# Patient Record
Sex: FEMALE | Race: WHITE | Employment: OTHER | URBAN - METROPOLITAN AREA
[De-identification: names, ages, dates, MRNs, and addresses within clinical notes are randomized per-mention and may not be internally consistent; named-entity substitution may affect disease eponyms.]

---

## 2020-10-15 ENCOUNTER — HOSPITAL ENCOUNTER (OUTPATIENT)
Dept: INFUSION THERAPY | Age: 70
Discharge: HOME | End: 2020-10-15
Payer: MEDICARE

## 2020-10-15 VITALS
RESPIRATION RATE: 18 BRPM | SYSTOLIC BLOOD PRESSURE: 160 MMHG | OXYGEN SATURATION: 100 % | HEART RATE: 63 BPM | TEMPERATURE: 97.6 F | DIASTOLIC BLOOD PRESSURE: 84 MMHG | WEIGHT: 100.7 LBS

## 2020-10-15 LAB
ANION GAP BLD CALC-SCNC: 16 MMOL/L (ref 10–20)
BUN BLD-MCNC: 14 MG/DL (ref 9–20)
CA-I BLD-MCNC: 1.24 MMOL/L (ref 1.12–1.32)
CHLORIDE BLD-SCNC: 97 MMOL/L (ref 98–107)
CO2 BLD-SCNC: 32 MMOL/L (ref 21–32)
CREAT BLD-MCNC: 0.7 MG/DL (ref 0.6–1.3)
GLUCOSE BLD-MCNC: 64 MG/DL (ref 65–100)
HCT VFR BLD CALC: 41 % (ref 35–47)
MAGNESIUM SERPL-MCNC: 2 MG/DL (ref 1.6–2.4)
PHOSPHATE SERPL-MCNC: 3.8 MG/DL (ref 2.6–4.7)
POTASSIUM BLD-SCNC: 3.3 MMOL/L (ref 3.5–5.1)
SERVICE CMNT-IMP: ABNORMAL
SODIUM BLD-SCNC: 141 MMOL/L (ref 136–145)

## 2020-10-15 PROCEDURE — 96372 THER/PROPH/DIAG INJ SC/IM: CPT

## 2020-10-15 PROCEDURE — 80047 BASIC METABLC PNL IONIZED CA: CPT

## 2020-10-15 PROCEDURE — 84100 ASSAY OF PHOSPHORUS: CPT

## 2020-10-15 PROCEDURE — 74011250636 HC RX REV CODE- 250/636: Performed by: FAMILY MEDICINE

## 2020-10-15 PROCEDURE — 36415 COLL VENOUS BLD VENIPUNCTURE: CPT

## 2020-10-15 PROCEDURE — 83735 ASSAY OF MAGNESIUM: CPT

## 2020-10-15 RX ORDER — TIMOLOL MALEATE 6.8 MG/ML
1 SOLUTION/ DROPS OPHTHALMIC DAILY
COMMUNITY

## 2020-10-15 RX ORDER — LANOLIN ALCOHOL/MO/W.PET/CERES
1000 CREAM (GRAM) TOPICAL DAILY
COMMUNITY

## 2020-10-15 RX ORDER — MORPHINE SULFATE 15 MG/1
15 TABLET ORAL 2 TIMES DAILY
COMMUNITY

## 2020-10-15 RX ORDER — LOSARTAN POTASSIUM 100 MG/1
100 TABLET ORAL DAILY
COMMUNITY

## 2020-10-15 RX ORDER — OXYCODONE HYDROCHLORIDE 10 MG/1
10 TABLET ORAL 4 TIMES DAILY
COMMUNITY

## 2020-10-15 RX ORDER — LEVOTHYROXINE SODIUM 100 UG/1
100 TABLET ORAL
COMMUNITY

## 2020-10-15 RX ORDER — METFORMIN HYDROCHLORIDE 500 MG/1
500 TABLET ORAL DAILY
COMMUNITY

## 2020-10-15 RX ORDER — ASPIRIN 81 MG/1
81 TABLET ORAL DAILY
COMMUNITY

## 2020-10-15 RX ORDER — OMEGA-3-ACID ETHYL ESTERS 1 G/1
1 CAPSULE, LIQUID FILLED ORAL DAILY
COMMUNITY

## 2020-10-15 RX ORDER — CYCLOSPORINE 0.5 MG/ML
1 EMULSION OPHTHALMIC DAILY
COMMUNITY

## 2020-10-15 RX ORDER — ERGOCALCIFEROL 1.25 MG/1
50000 CAPSULE ORAL DAILY
COMMUNITY

## 2020-10-15 RX ORDER — ATORVASTATIN CALCIUM 20 MG/1
20 TABLET, FILM COATED ORAL
COMMUNITY

## 2020-10-15 RX ADMIN — DENOSUMAB 60 MG: 60 INJECTION SUBCUTANEOUS at 14:54

## 2020-10-15 NOTE — PROGRESS NOTES
[facility] Short Visit Progress Note    Patient admitted to [facility] for Prolia ambulatory in stable condition. Assessment completed. No new concerns voiced. Patient states she has not had any invasive dental procedures within the last 6-8 weeks nor non scheduled. Labs obtained from [facility] [place] and sent for processing. I-stat performed. Results are within parameters to treat. Vital Signs:  Visit Vitals  BP (!) 160/84 (BP 1 Location: Left arm, BP Patient Position: Sitting)   Pulse 63   Temp 97.6 °F (36.4 °C)   Resp 18   Wt 45.7 kg (100 lb 11.2 oz)   SpO2 100%   Breastfeeding No     Recent Results (from the past 12 hour(s))   POC CHEM8    Collection Time: 10/15/20  2:34 PM   Result Value Ref Range    Calcium, ionized (POC) 1.24 1.12 - 1.32 mmol/L    Sodium (POC) 141 136 - 145 mmol/L    Potassium (POC) 3.3 (L) 3.5 - 5.1 mmol/L    Chloride (POC) 97 (L) 98 - 107 mmol/L    CO2 (POC) 32 21 - 32 mmol/L    Anion gap (POC) 16 10 - 20 mmol/L    Glucose (POC) 64 (L) 65 - 100 mg/dL    BUN (POC) 14 9 - 20 mg/dL    Creatinine (POC) 0.7 0.6 - 1.3 mg/dL    GFRAA, POC >60 >60 ml/min/1.73m2    GFRNA, POC >60 >60 ml/min/1.73m2    Hematocrit (POC) 41 35.0 - 47.0 %    Comment Comment Not Indicated. Medications:  Medications Administered     denosumab (PROLIA) injection 60 mg     Admin Date  10/15/2020 Action  Given Dose  60 mg Route  SubCUTAneous Administered By  George Diop RN              Given SQ in left upper arm    Patient tolerated treatment well. Patient discharged from [place] [address] ambulatory in no distress. Patient aware of next appointment.     Future Appointments   Date Time Provider Ray Yoder   4/15/2021  2:00 PM SS INF4 CH4 <1H SHARLA Montemayor

## 2021-04-15 ENCOUNTER — HOSPITAL ENCOUNTER (OUTPATIENT)
Dept: INFUSION THERAPY | Age: 71
Discharge: HOME | End: 2021-04-15

## 2022-02-07 ENCOUNTER — HOSPITAL ENCOUNTER (OUTPATIENT)
Dept: INFUSION THERAPY | Age: 72
Discharge: HOME | End: 2022-02-07
Payer: MEDICARE

## 2022-02-07 VITALS
HEART RATE: 68 BPM | SYSTOLIC BLOOD PRESSURE: 120 MMHG | TEMPERATURE: 97.7 F | DIASTOLIC BLOOD PRESSURE: 64 MMHG | OXYGEN SATURATION: 96 % | HEIGHT: 65 IN | WEIGHT: 119 LBS | RESPIRATION RATE: 16 BRPM | BODY MASS INDEX: 19.83 KG/M2

## 2022-02-07 LAB
ANION GAP BLD CALC-SCNC: 10 MMOL/L (ref 10–20)
CA-I BLD-MCNC: 1.23 MMOL/L (ref 1.12–1.32)
CHLORIDE BLD-SCNC: 104 MMOL/L (ref 98–107)
CO2 BLD-SCNC: 31.3 MMOL/L (ref 21–32)
CREAT BLD-MCNC: 0.68 MG/DL (ref 0.6–1.3)
GLUCOSE BLD-MCNC: 110 MG/DL (ref 65–100)
MAGNESIUM SERPL-MCNC: 2 MG/DL (ref 1.6–2.4)
PHOSPHATE SERPL-MCNC: 3.8 MG/DL (ref 2.6–4.7)
POTASSIUM BLD-SCNC: 3.9 MMOL/L (ref 3.5–5.1)
SERVICE CMNT-IMP: ABNORMAL
SODIUM BLD-SCNC: 144 MMOL/L (ref 136–145)

## 2022-02-07 PROCEDURE — 96372 THER/PROPH/DIAG INJ SC/IM: CPT

## 2022-02-07 PROCEDURE — 84100 ASSAY OF PHOSPHORUS: CPT

## 2022-02-07 PROCEDURE — 36415 COLL VENOUS BLD VENIPUNCTURE: CPT

## 2022-02-07 PROCEDURE — 80047 BASIC METABLC PNL IONIZED CA: CPT

## 2022-02-07 PROCEDURE — 83735 ASSAY OF MAGNESIUM: CPT

## 2022-02-07 PROCEDURE — 74011250636 HC RX REV CODE- 250/636: Performed by: FAMILY MEDICINE

## 2022-02-07 RX ADMIN — DENOSUMAB 60 MG: 60 INJECTION SUBCUTANEOUS at 11:48

## 2022-02-07 NOTE — PROGRESS NOTES
[facility] Progress Note    Date: 2022    Name: Sandra Umana    MRN: [id number]         : 1950    Ms. Preeti Ruiz arrived ambulatory and in no distress for Prolia Injection. Assessment was completed, no acute issues at this time, no new complaints voiced. Patient denies any recent or planned dental work. Ms. Sammie Ham vitals were reviewed. Visit Vitals  /64   Pulse 68   Temp 97.7 °F (36.5 °C)   Resp 16   Ht 5' 5\" (1.651 m)   Wt 54 kg (119 lb)   SpO2 96%   Breastfeeding No   BMI 19.80 kg/m²     Recent Results (from the past 24 hour(s))   PHOSPHORUS    Collection Time: 22 11:36 AM   Result Value Ref Range    Phosphorus 3.8 2.6 - 4.7 MG/DL   POC CHEM8    Collection Time: 22 11:36 AM   Result Value Ref Range    Calcium, ionized (POC) 1.23 1.12 - 1.32 mmol/L    Sodium (POC) 144 136 - 145 mmol/L    Potassium (POC) 3.9 3.5 - 5.1 mmol/L    Chloride (POC) 104 98 - 107 mmol/L    CO2 (POC) 31.3 21 - 32 mmol/L    Anion gap (POC) 10 10 - 20 mmol/L    Glucose (POC) 110 (H) 65 - 100 mg/dL    Creatinine (POC) 0.68 0.6 - 1.3 mg/dL    GFRAA, POC >60 >60 ml/min/1.73m2    GFRNA, POC >60 >60 ml/min/1.73m2    Comment Comment Not Indicated. Medications:  Medications Administered     denosumab (PROLIA) injection 60 mg     Admin Date  2022 Action  Given Dose  60 mg Route  SubCUTAneous Administered By  Raine Velez RN                Ms. Preeti Ruiz tolerated treatment well and was discharged from [place] [address] in stable condition. Patient states her doctor will schedule the next injection based on her endocrinology labs.     Evelyn Tim RN  2022

## 2022-11-11 ENCOUNTER — APPOINTMENT (OUTPATIENT)
Dept: GENERAL RADIOLOGY | Age: 72
End: 2022-11-11
Attending: EMERGENCY MEDICINE
Payer: MEDICARE

## 2022-11-11 ENCOUNTER — HOSPITAL ENCOUNTER (EMERGENCY)
Age: 72
Discharge: HOME | End: 2022-11-11
Attending: EMERGENCY MEDICINE
Payer: MEDICARE

## 2022-11-11 VITALS
OXYGEN SATURATION: 100 % | TEMPERATURE: 97.3 F | BODY MASS INDEX: 19.99 KG/M2 | WEIGHT: 120 LBS | DIASTOLIC BLOOD PRESSURE: 73 MMHG | SYSTOLIC BLOOD PRESSURE: 140 MMHG | RESPIRATION RATE: 14 BRPM | HEIGHT: 65 IN | HEART RATE: 75 BPM

## 2022-11-11 DIAGNOSIS — R55 SYNCOPE AND COLLAPSE: Primary | ICD-10-CM

## 2022-11-11 LAB
ALBUMIN SERPL-MCNC: 3.1 G/DL (ref 3.5–5)
ALBUMIN/GLOB SERPL: 1.1 {RATIO} (ref 1.1–2.2)
ALP SERPL-CCNC: 68 U/L (ref 45–117)
ALT SERPL-CCNC: 28 U/L (ref 12–78)
ANION GAP SERPL CALC-SCNC: 3 MMOL/L (ref 5–15)
APPEARANCE UR: CLEAR
AST SERPL-CCNC: 22 U/L (ref 15–37)
ATRIAL RATE: 49 BPM
BACTERIA URNS QL MICRO: NEGATIVE /HPF
BASOPHILS # BLD: 0 K/UL (ref 0–0.1)
BASOPHILS NFR BLD: 1 % (ref 0–1)
BILIRUB SERPL-MCNC: 0.6 MG/DL (ref 0.2–1)
BILIRUB UR QL: NEGATIVE
BUN SERPL-MCNC: 14 MG/DL (ref 6–20)
BUN/CREAT SERPL: 14 (ref 12–20)
CALCIUM SERPL-MCNC: 8.7 MG/DL (ref 8.5–10.1)
CALCULATED P AXIS, ECG09: 58 DEGREES
CALCULATED R AXIS, ECG10: 7 DEGREES
CALCULATED T AXIS, ECG11: 39 DEGREES
CHLORIDE SERPL-SCNC: 106 MMOL/L (ref 97–108)
CO2 SERPL-SCNC: 31 MMOL/L (ref 21–32)
COLOR UR: ABNORMAL
COMMENT, HOLDF: NORMAL
CREAT SERPL-MCNC: 1.02 MG/DL (ref 0.55–1.02)
DIAGNOSIS, 93000: NORMAL
DIFFERENTIAL METHOD BLD: ABNORMAL
EOSINOPHIL # BLD: 0.2 K/UL (ref 0–0.4)
EOSINOPHIL NFR BLD: 3 % (ref 0–7)
EPITH CASTS URNS QL MICRO: ABNORMAL /LPF
ERYTHROCYTE [DISTWIDTH] IN BLOOD BY AUTOMATED COUNT: 12.4 % (ref 11.5–14.5)
GLOBULIN SER CALC-MCNC: 2.8 G/DL (ref 2–4)
GLUCOSE BLD STRIP.AUTO-MCNC: 90 MG/DL (ref 65–117)
GLUCOSE SERPL-MCNC: 92 MG/DL (ref 65–100)
GLUCOSE UR STRIP.AUTO-MCNC: NEGATIVE MG/DL
HCT VFR BLD AUTO: 35.4 % (ref 35–47)
HGB BLD-MCNC: 11.6 G/DL (ref 11.5–16)
HGB UR QL STRIP: NEGATIVE
IMM GRANULOCYTES # BLD AUTO: 0 K/UL (ref 0–0.04)
IMM GRANULOCYTES NFR BLD AUTO: 0 % (ref 0–0.5)
KETONES UR QL STRIP.AUTO: NEGATIVE MG/DL
LEUKOCYTE ESTERASE UR QL STRIP.AUTO: ABNORMAL
LYMPHOCYTES # BLD: 1.4 K/UL (ref 0.8–3.5)
LYMPHOCYTES NFR BLD: 23 % (ref 12–49)
MAGNESIUM SERPL-MCNC: 2 MG/DL (ref 1.6–2.4)
MCH RBC QN AUTO: 30.7 PG (ref 26–34)
MCHC RBC AUTO-ENTMCNC: 32.8 G/DL (ref 30–36.5)
MCV RBC AUTO: 93.7 FL (ref 80–99)
MONOCYTES # BLD: 0.5 K/UL (ref 0–1)
MONOCYTES NFR BLD: 8 % (ref 5–13)
NEUTS SEG # BLD: 3.9 K/UL (ref 1.8–8)
NEUTS SEG NFR BLD: 65 % (ref 32–75)
NITRITE UR QL STRIP.AUTO: NEGATIVE
NRBC # BLD: 0 K/UL (ref 0–0.01)
NRBC BLD-RTO: 0 PER 100 WBC
P-R INTERVAL, ECG05: 152 MS
PH UR STRIP: 7.5 [PH] (ref 5–8)
PLATELET # BLD AUTO: 203 K/UL (ref 150–400)
PMV BLD AUTO: 9.2 FL (ref 8.9–12.9)
POTASSIUM SERPL-SCNC: 4.2 MMOL/L (ref 3.5–5.1)
PROT SERPL-MCNC: 5.9 G/DL (ref 6.4–8.2)
PROT UR STRIP-MCNC: NEGATIVE MG/DL
Q-T INTERVAL, ECG07: 464 MS
QRS DURATION, ECG06: 74 MS
QTC CALCULATION (BEZET), ECG08: 419 MS
RBC # BLD AUTO: 3.78 M/UL (ref 3.8–5.2)
RBC #/AREA URNS HPF: ABNORMAL /HPF (ref 0–5)
SAMPLES BEING HELD,HOLD: NORMAL
SERVICE CMNT-IMP: NORMAL
SODIUM SERPL-SCNC: 140 MMOL/L (ref 136–145)
SP GR UR REFRACTOMETRY: 1 (ref 1–1.03)
UR CULT HOLD, URHOLD: NORMAL
UROBILINOGEN UR QL STRIP.AUTO: 0.2 EU/DL (ref 0.2–1)
VENTRICULAR RATE, ECG03: 49 BPM
WBC # BLD AUTO: 6.1 K/UL (ref 3.6–11)
WBC URNS QL MICRO: ABNORMAL /HPF (ref 0–4)

## 2022-11-11 PROCEDURE — 74011250636 HC RX REV CODE- 250/636: Performed by: EMERGENCY MEDICINE

## 2022-11-11 PROCEDURE — 71045 X-RAY EXAM CHEST 1 VIEW: CPT

## 2022-11-11 PROCEDURE — 96361 HYDRATE IV INFUSION ADD-ON: CPT

## 2022-11-11 PROCEDURE — 81001 URINALYSIS AUTO W/SCOPE: CPT

## 2022-11-11 PROCEDURE — 36415 COLL VENOUS BLD VENIPUNCTURE: CPT

## 2022-11-11 PROCEDURE — 96360 HYDRATION IV INFUSION INIT: CPT

## 2022-11-11 PROCEDURE — 85025 COMPLETE CBC W/AUTO DIFF WBC: CPT

## 2022-11-11 PROCEDURE — 93005 ELECTROCARDIOGRAM TRACING: CPT

## 2022-11-11 PROCEDURE — 82962 GLUCOSE BLOOD TEST: CPT

## 2022-11-11 PROCEDURE — 80053 COMPREHEN METABOLIC PANEL: CPT

## 2022-11-11 PROCEDURE — 99285 EMERGENCY DEPT VISIT HI MDM: CPT

## 2022-11-11 PROCEDURE — 83735 ASSAY OF MAGNESIUM: CPT

## 2022-11-11 RX ADMIN — SODIUM CHLORIDE 1000 ML: 9 INJECTION, SOLUTION INTRAVENOUS at 14:31

## 2022-11-11 NOTE — ED TRIAGE NOTES
Pt with sudden onset of dizziness around 11 to 1130 this am. Family states they were in the vehicle and when they arrived home pt was unresponsive.    Signs and symptoms: dizziness   Code Stroke activation time: 3125  Provider at bedside time:  1348  VAN score: Negative  Last Known Well (Time): 1100  Blood Glucose Result/Time: 90   Blood Pressure: 74/47  Anticoagulants (List medications): N/A

## 2022-11-11 NOTE — ED PROVIDER NOTES
Patient is a 68-year-old woman who comes into the emergency department after syncopal episode. Patient reports that she has been feeling lightheaded, fatigued, and experiencing dry mouth that she remembers feeling lightheaded prior to passing out. Family reports symptoms began while patient was in the car and when they arrived home she was unresponsive, she was breathing and maintained her pulse while she was unconscious, when she awoke she was drowsy and disoriented but has since returned to her baseline mental status. Patient reports that she still feels lightheaded and dry. She has not had any fevers, chills, nausea, vomiting, diarrhea, decreased appetite. Patient reports that her baseline blood pressures are 90s over 60s on her losartan. Patient has had no recent adjustments to her medications and does not believe she made any medication errors today. The history is provided by the patient. Syncope   This is a new problem. The current episode started 1 to 2 hours ago. The problem has been resolved. She lost consciousness for a period of 1 to 5 minutes. The problem is associated with nothing. Associated symptoms include confusion, malaise/fatigue, headaches and light-headedness. Pertinent negatives include no visual change, no chest pain, no palpitations, no fever, no nausea, no vomiting, no focal weakness and no head injury. She has tried nothing for the symptoms. Past Medical History:   Diagnosis Date    Anxiety     Post-menopausal osteoporosis     Sjogren's syndrome ([facility] [address]        No past surgical history on file. No family history on file.     Social History     Socioeconomic History    Marital status:      Spouse name: Not on file    Number of children: Not on file    Years of education: Not on file    Highest education level: Not on file   Occupational History    Not on file   Tobacco Use    Smoking status: Former     Types: Cigarettes     Quit date: 1/8/1985     Years since quittin.8    Smokeless tobacco: Never   Substance and Sexual Activity    Alcohol use: Yes    Drug use: Not on file    Sexual activity: Not on file   Other Topics Concern    Not on file   Social History Narrative    Not on file     Social Determinants of Health     Financial Resource Strain: Not on file   Food Insecurity: Not on file   Transportation Needs: Not on file   Physical Activity: Not on file   Stress: Not on file   Social Connections: Not on file   Intimate Partner Violence: Not on file   Housing Stability: Not on file         ALLERGIES: Sulfa (sulfonamide antibiotics)    Review of Systems   Constitutional:  Positive for malaise/fatigue. Negative for fever. Respiratory:  Negative for shortness of breath. Cardiovascular:  Positive for syncope. Negative for chest pain and palpitations. Gastrointestinal:  Negative for diarrhea, nausea and vomiting. Neurological:  Positive for light-headedness and headaches. Negative for focal weakness. Psychiatric/Behavioral:  Positive for confusion. All other systems reviewed and are negative. Vitals:    22 1347   BP: (!) 74/47   Pulse: 83   Resp: 16   Temp: 97.3 °F (36.3 °C)   SpO2: 97%   Weight: 54.4 kg (120 lb)   Height: 5' 5\" (1.651 m)            Physical Exam  Vitals and nursing note reviewed. Constitutional:       General: She is not in acute distress. Appearance: She is well-developed. HENT:      Head: Normocephalic and atraumatic. Eyes:      Pupils: Pupils are equal, round, and reactive to light. Cardiovascular:      Rate and Rhythm: Normal rate and regular rhythm. Pulmonary:      Effort: Pulmonary effort is normal.      Breath sounds: Normal breath sounds. Abdominal:      General: There is no distension. Palpations: Abdomen is soft. Tenderness: There is no abdominal tenderness. Musculoskeletal:      Cervical back: Normal range of motion and neck supple. Skin:     General: Skin is warm and dry.       Capillary Refill: Capillary refill takes less than 2 seconds. Neurological:      General: No focal deficit present. Mental Status: She is alert and oriented to person, place, and time. Psychiatric:         Mood and Affect: Mood normal.         Behavior: Behavior normal.        [facility]    ED Course as of 11/11/22 2155 Fri Nov 11, 2022   1529 EKG at 2:16 PM shows normal sinus rhythm, 49 bpm, normal axis, normal intervals, no STEMI, no ectopy [IO]      ED Course User Index  [IO] Kay Larson MD       Procedures      The patient is resting comfortably and feels better, is alert, talkative, interactive and in no distress. I suspect that the patient's syncopal episode was related to her profoundly low blood pressure which corrected with IV fluids in the emergency department. The repeat examination is unremarkable and benign. The patient is neurologically intact, has a normal mental status and is ambulatory in the ED. The history, exam, diagnostic testing (if any) and the patient's current condition do not suggest arrhythmia, STEMI, seizure, meningitis, stroke, sepsis, subarachnoid hemorrhage, intracranial bleeding, encephalitis or other significant pathology that would warrant further testing, continued ED treatment, admission, neurological consultation, or other specialist evaluation at this point. The vital signs have been stable. The patient's condition is stable and appropriate for discharge. The patient will pursue further outpatient evaluation with the primary care physician or other designated or consulting physician as indicated in the discharge instructions.

## 2022-11-11 NOTE — DISCHARGE INSTRUCTIONS
You have been evaluated in the Emergency Department today for a possible seizure or syncopal event. Please be sure to review the return precautions included with your discharge instructions. Additionally, the [place] Department of [employer] has additional requirements for evaluation and follow-up that need to be completed prior to re-instatement of your driving privileges. [facility] Seizure/Blackout Policy  ([email or web address] asp)     It is the policy of the Department of Motor Vehicles, based upon guidance and recommendations from the Medical Advisory Board, that an individual must be free of seizures for at least six months in order to establish that medication for the seizure or underlying cause of the seizure is effective and/or that none of the medical conditions that may have caused the seizure have reoccurred. Monitoring and Review of the   If an individual has a history of seizures, the individual and his/her physician must complete a Customer Medical Report ([email or web address]. pdf) before a [address] license may be issued. Once the medical report is received by [facility], it is reviewed to determine if the individual may be licensed to drive a motor vehicle. [facility] reserves the right to request additional information in order to assess the persons ability to safely operate a motor vehicle. Once the individual is licensed to operate a motor vehicle, he/she will be placed on periodic medical review with [facility] based on the medical information received, and will be required to furnish medical reports to [facility] every three, six, twelve or twenty-four months. [facility] may impose additional requirements on the individual depending on the information received by the agency.      If an individual is currently licensed and [facility] is notified that this individual has experienced a seizure, his/her driving privilege will be suspended for a period of six months from the date of the last episode. At the end of the six-month period, the individual must submit a Customer Medical Report ([email or web address]. pdf) to [facility] with Parts A, F and the Customer Information Sections completed so that the agency may determine whether the individuals driving privilege may be reinstated. DMV may also impose additional requirements on the individual depending on the information received by the agency. Unexplained Blackout/Loss of Consciousness  If an individual suffers an unexplained blackout or loss of consciousness (negative medical work-up with no defined cause), DMV will suspend the driving privilege for a period of 6 months from the date of the unexplained blackout, alteration of awareness, or loss of consciousness. At the end of the 6-month suspension period, the  is required to furnish a medical report to indicate that he/she was examined exactly 6 months from the date of his/her last blackout or loss of consciousness and has not suffered a subsequent blackout or loss of consciousness since that date. DMV may also impose additional requirements on the individual depending on the information received by the agency. Breakthrough Seizures  If an individual suffers a breakthrough seizure, defined as a seizure due to non-compliance (missed medication), physician manipulation of the drug regimen/dosage due to side effects, pregnancy, sleep deprivation, or concomitant illnesses, the individual may resume driving three months after the seizure. Proof of compliance may be requested.      Isolated (Single-Event) Seizures  If an individual has an isolated (single-event) seizure with a clearly identified cause for which treatment has been completed and with no risk of reoccurrence, the suspension period may be reduced to three months provided there is documentation from the individuals neurologist that the individual is now stable and no unfavorable conditions have been noted.  Unfavorable conditions include, but are not limited to:  A positive family history of seizures  The seizure was focal in origin  The EEG showed focal or generalized spikes  Neurological deficits were noted before the seizure

## 2022-11-17 ENCOUNTER — OFFICE VISIT (OUTPATIENT)
Dept: CARDIOLOGY CLINIC | Age: 72
End: 2022-11-17
Payer: MEDICARE

## 2022-11-17 VITALS
HEIGHT: 65 IN | WEIGHT: 119.8 LBS | BODY MASS INDEX: 19.96 KG/M2 | SYSTOLIC BLOOD PRESSURE: 118 MMHG | HEART RATE: 70 BPM | DIASTOLIC BLOOD PRESSURE: 76 MMHG | OXYGEN SATURATION: 96 %

## 2022-11-17 DIAGNOSIS — R00.1 BRADYCARDIA: ICD-10-CM

## 2022-11-17 DIAGNOSIS — R55 SYNCOPE, UNSPECIFIED SYNCOPE TYPE: Primary | ICD-10-CM

## 2022-11-17 DIAGNOSIS — I10 PRIMARY HYPERTENSION: ICD-10-CM

## 2022-11-17 PROCEDURE — 1090F PRES/ABSN URINE INCON ASSESS: CPT | Performed by: INTERNAL MEDICINE

## 2022-11-17 PROCEDURE — 99204 OFFICE O/P NEW MOD 45 MIN: CPT | Performed by: INTERNAL MEDICINE

## 2022-11-17 PROCEDURE — 1101F PT FALLS ASSESS-DOCD LE1/YR: CPT | Performed by: INTERNAL MEDICINE

## 2022-11-17 PROCEDURE — G0463 HOSPITAL OUTPT CLINIC VISIT: HCPCS | Performed by: INTERNAL MEDICINE

## 2022-11-17 PROCEDURE — G8432 DEP SCR NOT DOC, RNG: HCPCS | Performed by: INTERNAL MEDICINE

## 2022-11-17 PROCEDURE — 3074F SYST BP LT 130 MM HG: CPT | Performed by: INTERNAL MEDICINE

## 2022-11-17 PROCEDURE — G8420 CALC BMI NORM PARAMETERS: HCPCS | Performed by: INTERNAL MEDICINE

## 2022-11-17 PROCEDURE — 3017F COLORECTAL CA SCREEN DOC REV: CPT | Performed by: INTERNAL MEDICINE

## 2022-11-17 PROCEDURE — G8427 DOCREV CUR MEDS BY ELIG CLIN: HCPCS | Performed by: INTERNAL MEDICINE

## 2022-11-17 PROCEDURE — 1123F ACP DISCUSS/DSCN MKR DOCD: CPT | Performed by: INTERNAL MEDICINE

## 2022-11-17 PROCEDURE — G8536 NO DOC ELDER MAL SCRN: HCPCS | Performed by: INTERNAL MEDICINE

## 2022-11-17 PROCEDURE — 3078F DIAST BP <80 MM HG: CPT | Performed by: INTERNAL MEDICINE

## 2022-11-17 PROCEDURE — G8400 PT W/DXA NO RESULTS DOC: HCPCS | Performed by: INTERNAL MEDICINE

## 2022-11-17 NOTE — PATIENT INSTRUCTIONS
Echo- Syncope    30 day loop recorder- Syncope. Refer to Dr. Ike Adrian for Syncope. See Dr. Castro Max in 2 months.

## 2022-11-17 NOTE — PROGRESS NOTES
Reason to see patient: Syncope. HPI: Thiago Maddox is a 74 y.o. female with past medical history significant for hypertension, dyslipidemia, hypothyroidism is here for evaluation of symptoms of syncope. On November 11, 2022 she was with her  going for shopping and while driving in the passenger seat she apparently had passed out which initially was thought to be that she had taken a nap however when  reached home try to get her out of the car she did not move. At that time it was realized that she had actually passed out. There was no pulse noted by the  at that time. Patient did not react to any stimuli at that time. Few minutes later she gradually open her eyes and was slightly confused. She recalls having slight dizziness prior to going out for shopping. The whole episode of passing out lasted for 15 to 20 minutes. She had no bowel or bladder loss. No seizure-like activity. No symptoms of chest pain, shortness of breath, palpitations prior to or after the episode. No recurrence of the episode. Once she woke up completely and had no apparent neurological deficit she came [facility] ER via private vehicle. In the ER she was noted to have low blood pressure with a systolic blood pressure was 74. Her heart rate was also low at 49 bpm.  She was resuscitated with fluid infusion and her blood pressure improved. She felt better. Her labs were normal.  She was discharged home for further follow-up as an outpatient. Since that episode she has not had any new episodes of dizziness or passing out episode. No new change in her medications. She does not smoke tobacco or drink alcohol. EKG from November 11, 2022 was personally reviewed. EKG demonstrated normal sinus rhythm with sinus bradycardia with heart rate of 49 bpm normal intervals with normal ST segment. Lab results were personally reviewed. Plan:    1.   Syncope: Likely cause of syncope is hypotension of unclear etiology. She had COVID-vaccine and flu vaccine 2 weeks prior to this episode and had significant reaction to these medications where she felt significantly sick for couple days. She also admits to having low p.o. intake of fluids or liquids. Possibly she was dehydrated. She has been taking her medications as well and consequently became hypotensive. Will check echocardiogram for cardiac function. Check 30 Day Loop recorder to rule out arrhythmias. 2.  Bradycardia: This was noted at the time of presentation. Heart rate was 49 bpm we will do a 30 Day Loop recorder for further evaluation. 3.  Hypertension: Blood pressure is now normal.  She has resumed taking her losartan. 4.  History of diabetes/metabolic syndrome with prediabetes: Continues to take metformin but has made significant dietary changes. 5.  Dyslipidemia: Continue Lipitor and Lovaza. 6. Refer to Neurology for Syncope. 7. See Dr. Deepak Barnett in 2 months. ATTENTION:   This medical record was transcribed using an electronic medical records/speech recognition system. Although proofread, it may and can contain electronic, spelling and other errors. Corrections may be executed at a later time. Please feel free to contact us for any clarifications as needed. Past Medical History:   Diagnosis Date    Anxiety     Post-menopausal osteoporosis     Sjogren's syndrome ([facility] [address]             No past surgical history on file. No family history on file.         Social History     Socioeconomic History    Marital status:      Spouse name: Not on file    Number of children: Not on file    Years of education: Not on file    Highest education level: Not on file   Occupational History    Not on file   Tobacco Use    Smoking status: Former     Types: Cigarettes     Quit date: 1985     Years since quittin.8    Smokeless tobacco: Never   Substance and Sexual Activity    Alcohol use: Yes    Drug use: Not on file Sexual activity: Not on file   Other Topics Concern    Not on file   Social History Narrative    Not on file     Social Determinants of Health     Financial Resource Strain: Not on file   Food Insecurity: Not on file   Transportation Needs: Not on file   Physical Activity: Not on file   Stress: Not on file   Social Connections: Not on file   Intimate Partner Violence: Not on file   Housing Stability: Not on file         Allergies   Allergen Reactions    Sulfa (Sulfonamide Antibiotics) Hives            Current Outpatient Medications   Medication Sig Dispense Refill    traZODone (DESYREL) 100 mg tablet TAKE 1 TABLET BY MOUTH EVERY NIGHT 90 Tablet 1    levothyroxine (SYNTHROID) 100 mcg tablet Take 100 mcg by mouth daily. metFORMIN (GLUCOPHAGE) 500 mg tablet Take 500 mg by mouth daily. losartan (COZAAR) 100 mg tablet Take 100 mg by mouth daily. omega-3 acid ethyl esters (LOVAZA) 1 gram capsule Take 1 g by mouth daily. atorvastatin (LIPITOR) 20 mg tablet Take 20 mg by mouth five (5) days a week. oxyCODONE IR (ROXICODONE) 10 mg tab immediate release tablet Take 10 mg by mouth four (4) times daily. morphine IR (MS IR) 15 mg tablet Take 15 mg by mouth two (2) times a day. ergocalciferol (ERGOCALCIFEROL) 1,250 mcg (50,000 unit) capsule Take 50,000 Units by mouth every seven (7) days. aspirin delayed-release 81 mg tablet Take 81 mg by mouth daily. cyanocobalamin 1,000 mcg tablet Take 1,000 mcg by mouth daily. timoloL maleate 0.5 % drpd ophthalmic solution Administer 1 Drop to both eyes daily. ROS:  12 point review of systems was performed.  All negative except for HPI     Physical Exam:  Visit Vitals  /76 (BP 1 Location: Left upper arm, BP Patient Position: Sitting)   Pulse 70   Ht 5' 5\" (1.651 m)   Wt 119 lb 12.8 oz (54.3 kg)   SpO2 96%   BMI 19.94 kg/m²       Gen:  Well-developed, well-nourished, in no acute distress  HEENT:  Pink conjunctivae, PERRL, hearing intact to voice, moist mucous membranes  Neck:  Supple, without masses, thyroid non-tender  Resp:  No accessory muscle use, clear breath sounds without wheezes rales or rhonchi  Card:  No murmurs, normal S1, S2 without thrills, bruits or peripheral edema  Abd:  Soft, non-tender, non-distended, normoactive bowel sounds are present, no palpable organomegaly and no detectable hernias  Lymph:  No cervical or inguinal adenopathy  Musc:  No cyanosis or clubbing  Skin:  No rashes or ulcers, skin turgor is good  Neuro:  Cranial nerves are grossly intact, no focal motor weakness, follows commands appropriately  Psych:  Good insight, oriented to person, place and time, alert     Labs:     Lab Results   Component Value Date/Time    WBC 6.1 11/11/2022 01:55 PM    HGB 11.6 11/11/2022 01:55 PM    HCT 35.4 11/11/2022 01:55 PM    Hematocrit (POC) 41 10/15/2020 02:34 PM    PLATELET 108 70/36/5668 01:55 PM    MCV 93.7 11/11/2022 01:55 PM     Lab Results   Component Value Date/Time    Glucose 92 11/11/2022 01:55 PM    Glucose (POC) 90 11/11/2022 01:52 PM    Creatinine (POC) 0.68 02/07/2022 11:36 AM    Creatinine 1.02 11/11/2022 01:55 PM      No results found for: CHOL, CHOLPOCT, HDL, LDL, LDLC, LDLCPOC, LDLCEXT, TRIGL, TGLPOCT, CHHD, CHHDX  Lab Results   Component Value Date/Time    ALT (SGPT) 28 11/11/2022 01:55 PM    Alk.  phosphatase 68 11/11/2022 01:55 PM    Bilirubin, total 0.6 11/11/2022 01:55 PM    Albumin 3.1 (L) 11/11/2022 01:55 PM    Protein, total 5.9 (L) 11/11/2022 01:55 PM    PLATELET 044 21/05/9381 01:55 PM     No results found for: INR, INREXT, PTMR, PTP, PT1, PT2, INREXT   Lab Results   Component Value Date/Time    GFRNA, POC >60 02/07/2022 11:36 AM    GFRAA, POC >60 02/07/2022 11:36 AM    Creatinine 1.02 11/11/2022 01:55 PM    Creatinine (POC) 0.68 02/07/2022 11:36 AM    BUN 14 11/11/2022 01:55 PM    BUN (POC) 14 10/15/2020 02:34 PM    Sodium 140 11/11/2022 01:55 PM    Sodium (POC) 144 02/07/2022 11:36 AM    Potassium 4.2 11/11/2022 01:55 PM    Potassium (POC) 3.9 02/07/2022 11:36 AM    Chloride 106 11/11/2022 01:55 PM    Chloride (POC) 104 02/07/2022 11:36 AM    CO2 31 11/11/2022 01:55 PM    Magnesium 2.0 11/11/2022 01:55 PM    Phosphorus 3.8 02/07/2022 11:36 AM     No results found for: PSA, Gaetana Fortis, PSAR3, [id number], [id number]  No results found for: TSH, TSH2, TSH3, TSHP, TSHELE, TSHEXT, TT3, T3U, T3UP, FRT3, FT3, FT4, FT4P, T4, T4P, FT4T, TT7, TSHEXT   Lab Results   Component Value Date/Time    Glucose 92 11/11/2022 01:55 PM    Glucose (POC) 90 11/11/2022 01:52 PM      No results found for: CPK, RCK1, RCK2, RCK3, RCK4, CKMB, CKNDX, CKND1, TROPT, TROIQ, BNPP, BNP   No results found for: BNP, BNPP, BNPPPOC, XBNPT, BNPNT   Lab Results   Component Value Date/Time    Sodium 140 11/11/2022 01:55 PM    Potassium 4.2 11/11/2022 01:55 PM    Chloride 106 11/11/2022 01:55 PM    CO2 31 11/11/2022 01:55 PM    Anion gap 3 (L) 11/11/2022 01:55 PM    Glucose 92 11/11/2022 01:55 PM    BUN 14 11/11/2022 01:55 PM    Creatinine 1.02 11/11/2022 01:55 PM    BUN/Creatinine ratio 14 11/11/2022 01:55 PM    Calcium 8.7 11/11/2022 01:55 PM      Lab Results   Component Value Date/Time    Sodium 140 11/11/2022 01:55 PM    Potassium 4.2 11/11/2022 01:55 PM    Chloride 106 11/11/2022 01:55 PM    CO2 31 11/11/2022 01:55 PM    Anion gap 3 (L) 11/11/2022 01:55 PM    Glucose 92 11/11/2022 01:55 PM    BUN 14 11/11/2022 01:55 PM    Creatinine 1.02 11/11/2022 01:55 PM    BUN/Creatinine ratio 14 11/11/2022 01:55 PM    Calcium 8.7 11/11/2022 01:55 PM    Bilirubin, total 0.6 11/11/2022 01:55 PM    ALT (SGPT) 28 11/11/2022 01:55 PM    Alk.  phosphatase 68 11/11/2022 01:55 PM    Protein, total 5.9 (L) 11/11/2022 01:55 PM    Albumin 3.1 (L) 11/11/2022 01:55 PM    Globulin 2.8 11/11/2022 01:55 PM    A-G Ratio 1.1 11/11/2022 01:55 PM      No results found for: HBA1C, [id number], ZKM1DMPB, SMJ6RWRH      No results for input(s): CPK, CKMB, TROIQ in the last 72 hours. No lab exists for component: CKQMB, CPKMB        Problem List:     Problem List  Date Reviewed: 2/1/2020   None        Yandel Doshi MD, [facility]

## 2022-11-17 NOTE — PROGRESS NOTES
All orders entered per verbal orders of Dr. Sully Bonilla. MD Sundar    Echo- Syncope     30 day loop recorder- Syncope. Message sent to Ranjit Holt  Refer to Dr. Earl Arenas for Syncope. Referral given to pt  See Dr. Jona Marcus in 2 months.

## 2022-11-17 NOTE — PROGRESS NOTES
Dariel Stanley is a 74 y.o. female    Chief Complaint   Patient presents with    New Patient     Hypotension, geneva     Dizziness       Vitals:    11/17/22 1012   BP: 118/76   BP 1 Location: Left upper arm   BP Patient Position: Sitting   Pulse: 70   Height: 5' 5\" (1.651 m)   Weight: 119 lb 12.8 oz (54.3 kg)   SpO2: 96%     Questions about driving and has a trip to [facility] for 11/28/2022. Chest pain no    SOB no    Dizziness nothing since she was seen in the ED     Swelling no    Refills no      1. Have you been to the ER, urgent care clinic since your last visit? Hospitalized since your last visit? ED 11/11    2. Have you seen or consulted any other health care providers outside of the [address] since your last visit? Include any pap smears or colon screening.  No

## 2022-11-18 ENCOUNTER — TELEPHONE (OUTPATIENT)
Dept: CARDIOLOGY CLINIC | Age: 72
End: 2022-11-18

## 2022-11-18 ENCOUNTER — ANCILLARY PROCEDURE (OUTPATIENT)
Dept: CARDIOLOGY CLINIC | Age: 72
End: 2022-11-18
Payer: MEDICARE

## 2022-11-18 VITALS
HEIGHT: 65 IN | DIASTOLIC BLOOD PRESSURE: 72 MMHG | SYSTOLIC BLOOD PRESSURE: 120 MMHG | WEIGHT: 119 LBS | BODY MASS INDEX: 19.83 KG/M2

## 2022-11-18 DIAGNOSIS — R55 SYNCOPE, UNSPECIFIED SYNCOPE TYPE: ICD-10-CM

## 2022-11-18 LAB
ECHO AO ASC DIAM: 3.2 CM
ECHO AO ASCENDING AORTA INDEX: 2.01 CM/M2
ECHO AO ROOT DIAM: 3.4 CM
ECHO AO ROOT INDEX: 2.14 CM/M2
ECHO AV AREA PEAK VELOCITY: 2 CM2
ECHO AV AREA VTI: 2.3 CM2
ECHO AV AREA/BSA PEAK VELOCITY: 1.3 CM2/M2
ECHO AV AREA/BSA VTI: 1.4 CM2/M2
ECHO AV MEAN GRADIENT: 3 MMHG
ECHO AV MEAN VELOCITY: 0.8 M/S
ECHO AV PEAK GRADIENT: 7 MMHG
ECHO AV PEAK VELOCITY: 1.3 M/S
ECHO AV VELOCITY RATIO: 0.77
ECHO AV VTI: 25.4 CM
ECHO EST RA PRESSURE: 3 MMHG
ECHO LA DIAMETER INDEX: 2.39 CM/M2
ECHO LA DIAMETER: 3.8 CM
ECHO LA TO AORTIC ROOT RATIO: 1.12
ECHO LA VOL 2C: 49 ML (ref 22–52)
ECHO LA VOL 4C: 53 ML (ref 22–52)
ECHO LA VOL BP: 52 ML (ref 22–52)
ECHO LA VOL/BSA BIPLANE: 33 ML/M2 (ref 16–34)
ECHO LA VOLUME AREA LENGTH: 55 ML
ECHO LA VOLUME INDEX A2C: 31 ML/M2 (ref 16–34)
ECHO LA VOLUME INDEX A4C: 33 ML/M2 (ref 16–34)
ECHO LA VOLUME INDEX AREA LENGTH: 35 ML/M2 (ref 16–34)
ECHO LV E' LATERAL VELOCITY: 9 CM/S
ECHO LV E' SEPTAL VELOCITY: 6 CM/S
ECHO LV EDV A2C: 48 ML
ECHO LV EDV A4C: 56 ML
ECHO LV EDV BP: 51 ML (ref 56–104)
ECHO LV EDV INDEX A4C: 35 ML/M2
ECHO LV EDV INDEX BP: 32 ML/M2
ECHO LV EDV NDEX A2C: 30 ML/M2
ECHO LV EJECTION FRACTION A2C: 57 %
ECHO LV EJECTION FRACTION A4C: 70 %
ECHO LV EJECTION FRACTION BIPLANE: 62 % (ref 55–100)
ECHO LV ESV A2C: 20 ML
ECHO LV ESV A4C: 17 ML
ECHO LV ESV BP: 19 ML (ref 19–49)
ECHO LV ESV INDEX A2C: 13 ML/M2
ECHO LV ESV INDEX A4C: 11 ML/M2
ECHO LV ESV INDEX BP: 12 ML/M2
ECHO LV FRACTIONAL SHORTENING: 36 % (ref 28–44)
ECHO LV INTERNAL DIMENSION DIASTOLE INDEX: 2.77 CM/M2
ECHO LV INTERNAL DIMENSION DIASTOLIC: 4.4 CM (ref 3.9–5.3)
ECHO LV INTERNAL DIMENSION SYSTOLIC INDEX: 1.76 CM/M2
ECHO LV INTERNAL DIMENSION SYSTOLIC: 2.8 CM
ECHO LV IVSD: 1 CM (ref 0.6–0.9)
ECHO LV MASS 2D: 137.8 G (ref 67–162)
ECHO LV MASS INDEX 2D: 86.6 G/M2 (ref 43–95)
ECHO LV POSTERIOR WALL DIASTOLIC: 0.9 CM (ref 0.6–0.9)
ECHO LV RELATIVE WALL THICKNESS RATIO: 0.41
ECHO LVOT AREA: 2.8 CM2
ECHO LVOT AV VTI INDEX: 0.84
ECHO LVOT DIAM: 1.9 CM
ECHO LVOT MEAN GRADIENT: 2 MMHG
ECHO LVOT PEAK GRADIENT: 4 MMHG
ECHO LVOT PEAK VELOCITY: 1 M/S
ECHO LVOT STROKE VOLUME INDEX: 38.1 ML/M2
ECHO LVOT SV: 60.6 ML
ECHO LVOT VTI: 21.4 CM
ECHO MV A VELOCITY: 0.86 M/S
ECHO MV AREA PHT: 3.5 CM2
ECHO MV E DECELERATION TIME (DT): 213.7 MS
ECHO MV E VELOCITY: 0.79 M/S
ECHO MV E/A RATIO: 0.92
ECHO MV E/E' LATERAL: 8.78
ECHO MV E/E' RATIO (AVERAGED): 10.97
ECHO MV E/E' SEPTAL: 13.17
ECHO MV PRESSURE HALF TIME (PHT): 62 MS
ECHO RIGHT VENTRICULAR SYSTOLIC PRESSURE (RVSP): 32 MMHG
ECHO RV FREE WALL PEAK S': 16 CM/S
ECHO RV INTERNAL DIMENSION: 3.2 CM
ECHO RV TAPSE: 2.1 CM (ref 1.7–?)
ECHO TV REGURGITANT MAX VELOCITY: 2.69 M/S
ECHO TV REGURGITANT PEAK GRADIENT: 29 MMHG

## 2022-11-18 PROCEDURE — 93306 TTE W/DOPPLER COMPLETE: CPT | Performed by: INTERNAL MEDICINE

## 2022-11-18 NOTE — TELEPHONE ENCOUNTER
Enrolled with [employer] - Ordered and being shipped to patient's home address on file. ETA within 5-7 business days. monitor  Received: JOHN Lofton  Per dr Lee Petite:   30 day loop recorder- Syncope.

## 2022-11-21 ENCOUNTER — TELEPHONE (OUTPATIENT)
Dept: CARDIOLOGY CLINIC | Age: 72
End: 2022-11-21

## 2022-11-21 NOTE — TELEPHONE ENCOUNTER
Pt had Echo on 11/18/22 and would like to speak to the nurse about her results.  Pt has a country scheduled for out of the country and needs results to determine whether or not she should cancel her trip      PT #     [phone]

## 2022-11-22 ENCOUNTER — PATIENT MESSAGE (OUTPATIENT)
Dept: CARDIOLOGY CLINIC | Age: 72
End: 2022-11-22

## 2022-11-22 NOTE — TELEPHONE ENCOUNTER
Patient is calling because her  are trying to take a trip on 11/28/22. Patient is trying to make sure that it is okay to go to [facility].    Patient said that she is trying to make sure it's okay based on her previous echocardiogram.    Patient also stated that she sent a message through [facility] also. Please advise.     [phone]

## 2022-11-22 NOTE — TELEPHONE ENCOUNTER
Unable to reach pt.  Message left with HIPPA compliant message and call back number        Mychart message sent

## 2022-12-27 ENCOUNTER — TELEPHONE (OUTPATIENT)
Dept: CARDIOLOGY CLINIC | Age: 72
End: 2022-12-27

## 2022-12-27 NOTE — TELEPHONE ENCOUNTER
Received Holter alert for new A Fib onset. Pt not currently on rate control medication or other blood thinners than ASA 81 mg daily. I consulted Dr. Laisha Ley in Dr. Ebonie Javier. He advise the pt come in sooner than later to see Dr Royal Dixon. Spoke with The pt, identified the pt with name and . Informed her of the alert and Dr Laisha Ley advisement. I inquired about symptoms at the time of the alert. Pt reported she had, had 14 people over for dinner that night, drank a couple glasses of white wine and had just gone to bed. Pt denied any symptoms at that time, but said she had felt a little \"off\" earlier in the evening, with indigestion type of feeling. We moved her appointment to see Dr Royal Dixon up to 1/3/23 @ [phone], but kept the 23 appointment in case of the need for a 2 week FU.

## 2023-01-03 ENCOUNTER — OFFICE VISIT (OUTPATIENT)
Dept: CARDIOLOGY CLINIC | Age: 73
End: 2023-01-03
Payer: MEDICARE

## 2023-01-03 ENCOUNTER — DOCUMENTATION ONLY (OUTPATIENT)
Dept: CARDIOLOGY CLINIC | Age: 73
End: 2023-01-03

## 2023-01-03 VITALS
HEIGHT: 65 IN | WEIGHT: 122 LBS | SYSTOLIC BLOOD PRESSURE: 120 MMHG | HEART RATE: 65 BPM | OXYGEN SATURATION: 99 % | DIASTOLIC BLOOD PRESSURE: 70 MMHG | BODY MASS INDEX: 20.33 KG/M2

## 2023-01-03 DIAGNOSIS — I48.0 PAF (PAROXYSMAL ATRIAL FIBRILLATION) (HCC): Primary | ICD-10-CM

## 2023-01-03 DIAGNOSIS — R00.1 BRADYCARDIA: ICD-10-CM

## 2023-01-03 PROCEDURE — 1090F PRES/ABSN URINE INCON ASSESS: CPT | Performed by: INTERNAL MEDICINE

## 2023-01-03 PROCEDURE — G0463 HOSPITAL OUTPT CLINIC VISIT: HCPCS | Performed by: INTERNAL MEDICINE

## 2023-01-03 PROCEDURE — 1101F PT FALLS ASSESS-DOCD LE1/YR: CPT | Performed by: INTERNAL MEDICINE

## 2023-01-03 PROCEDURE — G8400 PT W/DXA NO RESULTS DOC: HCPCS | Performed by: INTERNAL MEDICINE

## 2023-01-03 PROCEDURE — 99215 OFFICE O/P EST HI 40 MIN: CPT | Performed by: INTERNAL MEDICINE

## 2023-01-03 PROCEDURE — G8536 NO DOC ELDER MAL SCRN: HCPCS | Performed by: INTERNAL MEDICINE

## 2023-01-03 PROCEDURE — 3017F COLORECTAL CA SCREEN DOC REV: CPT | Performed by: INTERNAL MEDICINE

## 2023-01-03 PROCEDURE — G8420 CALC BMI NORM PARAMETERS: HCPCS | Performed by: INTERNAL MEDICINE

## 2023-01-03 PROCEDURE — 1123F ACP DISCUSS/DSCN MKR DOCD: CPT | Performed by: INTERNAL MEDICINE

## 2023-01-03 PROCEDURE — G8428 CUR MEDS NOT DOCUMENT: HCPCS | Performed by: INTERNAL MEDICINE

## 2023-01-03 PROCEDURE — G8432 DEP SCR NOT DOC, RNG: HCPCS | Performed by: INTERNAL MEDICINE

## 2023-01-03 RX ORDER — METOPROLOL SUCCINATE 25 MG/1
25 TABLET, EXTENDED RELEASE ORAL DAILY
Qty: 90 TABLET | Refills: 3 | Status: SHIPPED | OUTPATIENT
Start: 2023-01-03

## 2023-01-03 RX ORDER — LOSARTAN POTASSIUM 50 MG/1
50 TABLET ORAL DAILY
Qty: 90 TABLET | Refills: 3 | Status: SHIPPED | OUTPATIENT
Start: 2023-01-03

## 2023-01-03 RX ORDER — FAMOTIDINE 10 MG/1
10 TABLET ORAL 2 TIMES DAILY
COMMUNITY

## 2023-01-03 NOTE — PROGRESS NOTES
Refill per VO of Dr. Brizuela Northern:    Last appt: 11/18/2022    Future Appointments   Date Time Provider Ray Yoder   2/16/2023  2:00 PM Zunilda Mitchell MD [facility]   4/17/2023  2:00 PM Jaswinder Kwok MD [facility] AM[facility]       Requested Prescriptions     Pending Prescriptions Disp Refills    apixaban (ELIQUIS) 5 mg tablet 60 Tablet 12     Sig: Take 1 Tablet by mouth two (2) times a day. Signed Prescriptions Disp Refills    apixaban (ELIQUIS) 5 mg tablet 30 Tablet 12     Sig: Take 1 Tablet by mouth two (2) times a day. metoprolol succinate (TOPROL-XL) 25 mg XL tablet 90 Tablet 3     Sig: Take 1 Tablet by mouth daily. losartan (COZAAR) 50 mg tablet 90 Tablet 3     Sig: Take 1 Tablet by mouth daily.

## 2023-01-03 NOTE — PROGRESS NOTES
All orders entered per verbal orders of Dr. Shagufta Perez. MD Sundar    Start Toprol XL 25mg po daily. Reduce dose of Losartan to 50mg po daily. Eliquis 5mg po twice daily. Samples given  Refer to Dr. Cristobal Zuniga for PAF ablation. Message sent to [facility]'s staff  See Dr. Lexie Ortiz in 3 months. Refill per VO of Dr. Amrit Diez:    Last appt: 11/18/2022    Future Appointments   Date Time Provider Ray Beba   2/16/2023  2:00 PM Zunilda Mitchell MD [facility]   4/17/2023  2:00 PM Shagufta Kwok MD [facility] AM[facility]       Requested Prescriptions     Pending Prescriptions Disp Refills    apixaban (ELIQUIS) 5 mg tablet 30 Tablet 12     Sig: Take 1 Tablet by mouth two (2) times a day. metoprolol succinate (TOPROL-XL) 25 mg XL tablet 90 Tablet 3     Sig: Take 1 Tablet by mouth daily. losartan (COZAAR) 50 mg tablet 90 Tablet 3     Sig: Take 1 Tablet by mouth daily. apixaban (ELIQUIS) 5 mg tablet 28 Tablet 0     Sig: Take 1 Tablet by mouth two (2) times a day.

## 2023-01-03 NOTE — PATIENT INSTRUCTIONS
Start Toprol XL 25mg po daily. Reduce dose of Losartan to 50mg po daily. Eliquis 5mg po twice daily. Refer to Dr. Regi Eaton for PAF ablation. See Dr. Lexy Escalona in 3 months.

## 2023-01-03 NOTE — LETTER
1/3/2023 2:33 PM    Ms. Destinee Pollack  [address] [address]          To Whom it may Concern. To Whom It May Concern:    Destinee Pollack is currently under the care of [address] [place] [address] Cardiology. She was seen in our office at [facility] on Tuesday January 3, 2023. Letitia,has been cleared to resume driving/operating a motor vehicle, without restrictions, as of today. If there are questions or concerns please have the patient contact our office.             Sincerely,            Billie Powell MD

## 2023-01-03 NOTE — PROGRESS NOTES
[employer] 30 day holter results:    Preliminary Findings  Signature Date  31 events were transmitted. 0 patient triggered; 31 auto triggered  Patient monitored for 27d 12h 9m  AF occurred 10 time(s) with HR range of 76 - 187;  Total AF Hartman 2%  SVT occurred 1 time(s) with fastest run 168 BPM  VT occurred 1 time(s) with fastest run 178 BPM  8,859 PACs with PAC burden of <1%  4,927 PVCs with PVC burden of <1%    results sent to scanning

## 2023-01-03 NOTE — PROGRESS NOTES
Dalia Storey is a 74 y.o. female    Vitals:    01/03/23 1339   BP: 120/70   BP 1 Location: Left upper arm   BP Patient Position: Sitting   BP Cuff Size: Adult   Pulse: 65   Height: 5' 5\" (1.651 m)   Weight: 122 lb (55.3 kg)   SpO2: 99%       Chief Complaint   Patient presents with    Hypertension    Irregular Heart Beat     AFIB, SYNCOPE       Chest pain NO  SOB NO  Dizziness NO  Swelling NO  Recent hospital visit NO  Refills NO  COVID VACCINE YES  HAD COVID?  NO

## 2023-01-03 NOTE — PROGRESS NOTES
Office Follow-up    NAME: Lorrin Mortimer   :  1950  MRM:  [id number]    Date:  1/3/2023         Assessment and Plan:     1. Syncope: Unclear cause of syncope in 2022. She associates having COVID vaccination and possible dehydration and low blood pressure. We evaluated a 30 Day Loop recorder which demonstrated 10 episodes of atrial fibrillation with the highest heart rate of 187 bpm as well as 1 episode of short SVT. No prior history of atrial fibrillation. This is a new diagnosis. Syncope could be related to SVT/A. fib which can be treatable therefore she can resume driving. 2.  Paroxysmal atrial fibrillation: This new diagnosis as above. We will start her on Toprol-XL 25 mg p.o. daily for rate control. Start Eliquis 5 mg p.o. twice daily. Refer for A. fib ablation. Echocardiogram is normal.     3.  Bradycardia: Lowest heart rate recorded on Holter monitor was 50 bpm.  We will continue to monitor. 4.  Hypertension: Blood pressure is normal.  We will reduce the dosage of losartan to 50 mg p.o. daily as we are starting her on Toprol-XL. 5.  History of diabetes/metabolic syndrome with prediabetes: Continues to take metformin but has made significant dietary changes. 6.  Dyslipidemia: Continue Lipitor and Lovaza. 7. See Dr. Melisa Spain in 3 months. ATTENTION:   This medical record was transcribed using an electronic medical records/speech recognition system. Although proofread, it may and can contain electronic, spelling and other errors. Corrections may be executed at a later time. Please feel free to contact us for any clarifications as needed. Subjective:     Lorrin Mortimer, a 74y.o. year-old who presents for followup. She underwent Holter monitor as well as echocardiogram.  Echo demonstrated normal EF.   Holter demonstrated episodes of A. fib and SVT.      ----------------    22:  HPI: Lorrin Mortimer is a 74 y.o. female with past medical history significant for hypertension, dyslipidemia, hypothyroidism is here for evaluation of symptoms of syncope. On November 11, 2022 she was with her  going for shopping and while driving in the passenger seat she apparently had passed out which initially was thought to be that she had taken a nap however when  reached home try to get her out of the car she did not move. At that time it was realized that she had actually passed out. There was no pulse noted by the  at that time. Patient did not react to any stimuli at that time. Few minutes later she gradually open her eyes and was slightly confused. She recalls having slight dizziness prior to going out for shopping. The whole episode of passing out lasted for 15 to 20 minutes. She had no bowel or bladder loss. No seizure-like activity. No symptoms of chest pain, shortness of breath, palpitations prior to or after the episode. No recurrence of the episode. Once she woke up completely and had no apparent neurological deficit she came to the [facility] ER via private vehicle. In the ER she was noted to have low blood pressure with a systolic blood pressure was 74. Her heart rate was also low at 49 bpm.  She was resuscitated with fluid infusion and her blood pressure improved. She felt better. Her labs were normal.  She was discharged home for further follow-up as an outpatient. Since that episode she has not had any new episodes of dizziness or passing out episode. No new change in her medications.   She does not smoke tobacco or drink alcohol.     :Exam:     Physical Exam:  Visit Vitals  /70 (BP 1 Location: Left upper arm, BP Patient Position: Sitting, BP Cuff Size: Adult)   Pulse 65   Ht 5' 5\" (1.651 m)   Wt 122 lb (55.3 kg)   SpO2 99%   BMI 20.30 kg/m²     General appearance - alert, well appearing, and in no distress  Mental status - affect appropriate to mood  Eyes - sclera anicteric, moist mucous membranes  Neck - supple, no significant adenopathy  Chest - clear to auscultation, no wheezes, rales or rhonchi  Heart - normal rate, regular rhythm, normal S1, S2, no murmurs, rubs, clicks or gallops  Abdomen - soft, nontender, nondistended, no masses or organomegaly  Extremities - peripheral pulses normal, no pedal edema  Skin - normal coloration  no rashes    Medications:     Current Outpatient Medications   Medication Sig    famotidine (PEPCID) 10 mg tablet Take 10 mg by mouth two (2) times a day. traZODone (DESYREL) 100 mg tablet TAKE 1 TABLET BY MOUTH EVERY NIGHT    levothyroxine (SYNTHROID) 100 mcg tablet Take 100 mcg by mouth daily. metFORMIN (GLUCOPHAGE) 500 mg tablet Take 500 mg by mouth daily. losartan (COZAAR) 100 mg tablet Take 100 mg by mouth daily. omega-3 acid ethyl esters (LOVAZA) 1 gram capsule Take 1 g by mouth daily. atorvastatin (LIPITOR) 20 mg tablet Take 20 mg by mouth daily. oxyCODONE IR (ROXICODONE) 10 mg tab immediate release tablet Take 10 mg by mouth four (4) times daily. morphine IR (MS IR) 15 mg tablet Take 15 mg by mouth two (2) times a day. ergocalciferol (ERGOCALCIFEROL) 1,250 mcg (50,000 unit) capsule Take 50,000 Units by mouth every seven (7) days. aspirin delayed-release 81 mg tablet Take 81 mg by mouth daily. cyanocobalamin 1,000 mcg tablet Take 1,000 mcg by mouth daily. timoloL maleate 0.5 % drpd ophthalmic solution Administer 1 Drop to both eyes daily. No current facility-administered medications for this visit. Diagnostic Data Review:       No specialty comments available.       Lab Review:   No results found for: CHOL, CHOLX, CHLST, CHOLV, 001776, HDL, HDLP, LDL, LDLC, DLDLP, TGLX, TRIGL, TRIGP, CHHD, CHHDX  Lab Results   Component Value Date/Time    Creatinine (POC) 0.68 02/07/2022 11:36 AM    Creatinine 1.02 11/11/2022 01:55 PM     Lab Results   Component Value Date/Time    BUN 14 11/11/2022 01:55 PM    BUN (POC) 14 10/15/2020 02:34 PM     Lab Results   Component Value Date/Time    Potassium 4.2 11/11/2022 01:55 PM     No results found for: HBA1C, BOO4BELC  Lab Results   Component Value Date/Time    HGB 11.6 11/11/2022 01:55 PM     Lab Results   Component Value Date/Time    PLATELET 512 24/41/2096 01:55 PM     No results for input(s): CPK, CKMB, TROIQ in the last 72 hours. No lab exists for component: CKQMB, CPKMB             ___________________________________________________    Antonina Santos.  Mary Leblanc MD, [facility]

## 2023-01-04 ENCOUNTER — DOCUMENTATION ONLY (OUTPATIENT)
Dept: CARDIOLOGY CLINIC | Age: 73
End: 2023-01-04

## 2023-01-04 ENCOUNTER — PATIENT MESSAGE (OUTPATIENT)
Dept: CARDIOLOGY CLINIC | Age: 73
End: 2023-01-04

## 2023-01-04 NOTE — PROGRESS NOTES
Received denial for Eliquis, insurance prefers Xarelerick. Will consult Dr. Risa Hollingsworth for a change. Deniedon January 3  Request Reference Number: [id number].  ELIQUIS TAB 5MG is denied for not meeting the prior authorization requirement(s)    Documents sent to scanning

## 2023-01-05 NOTE — TELEPHONE ENCOUNTER
Refill per VO of Dr. Estrellita Pearce:    Last appt: 1/3/2023    Future Appointments   Date Time Provider Ray Yoder   2/16/2023  2:00 PM Zunilda Mitchell MD [facility]   4/17/2023  2:00 PM Bakari Kwok MD [facility]G       Requested Prescriptions     Pending Prescriptions Disp Refills    rivaroxaban (Xarelto) 20 mg tab tablet       Sig: Take 1 Tablet by mouth daily (with dinner).

## 2023-02-15 PROBLEM — I10 PRIMARY HYPERTENSION: Status: ACTIVE | Noted: 2023-02-15

## 2023-02-15 PROBLEM — Z79.01 ANTICOAGULATION ADEQUATE: Status: ACTIVE | Noted: 2023-02-15

## 2023-02-15 PROBLEM — E11.59 CONTROLLED TYPE 2 DIABETES MELLITUS WITH CIRCULATORY DISORDER, WITHOUT LONG-TERM CURRENT USE OF INSULIN (HCC): Status: ACTIVE | Noted: 2023-02-15

## 2023-02-15 PROBLEM — I48.0 PAF (PAROXYSMAL ATRIAL FIBRILLATION) (HCC): Status: ACTIVE | Noted: 2023-02-15

## 2023-02-16 ENCOUNTER — OFFICE VISIT (OUTPATIENT)
Dept: CARDIOLOGY CLINIC | Age: 73
End: 2023-02-16
Payer: MEDICARE

## 2023-02-16 VITALS
HEART RATE: 64 BPM | DIASTOLIC BLOOD PRESSURE: 70 MMHG | OXYGEN SATURATION: 94 % | HEIGHT: 65 IN | RESPIRATION RATE: 16 BRPM | SYSTOLIC BLOOD PRESSURE: 108 MMHG | BODY MASS INDEX: 20.48 KG/M2 | WEIGHT: 122.9 LBS

## 2023-02-16 DIAGNOSIS — I48.0 PAF (PAROXYSMAL ATRIAL FIBRILLATION) (HCC): ICD-10-CM

## 2023-02-16 DIAGNOSIS — Z79.01 ANTICOAGULATION ADEQUATE: ICD-10-CM

## 2023-02-16 DIAGNOSIS — E11.59 CONTROLLED TYPE 2 DIABETES MELLITUS WITH OTHER CIRCULATORY COMPLICATION, WITHOUT LONG-TERM CURRENT USE OF INSULIN (HCC): ICD-10-CM

## 2023-02-16 DIAGNOSIS — I10 PRIMARY HYPERTENSION: ICD-10-CM

## 2023-02-16 PROCEDURE — G0463 HOSPITAL OUTPT CLINIC VISIT: HCPCS | Performed by: INTERNAL MEDICINE

## 2023-02-16 PROCEDURE — 93005 ELECTROCARDIOGRAM TRACING: CPT | Performed by: INTERNAL MEDICINE

## 2023-02-16 NOTE — PROGRESS NOTES
Cardiac Electrophysiology OFFICE Consultation Note     Cardiologist: Dr. River Co  Assessment/Plan:   1. PAF (paroxysmal atrial fibrillation) (HCC)  -     AMB POC EKG ROUTINE W/ 12 LEADS, INTER & REP  2. Anticoagulation adequate  -     AMB POC EKG ROUTINE W/ 12 LEADS, INTER & REP  3. Controlled type 2 diabetes mellitus with other circulatory complication, without long-term current use of insulin (HCC)  -     AMB POC EKG ROUTINE W/ 12 LEADS, INTER & REP  4. Primary hypertension  -     AMB POC EKG ROUTINE W/ 12 LEADS, INTER & REP     Paroxysmal atrial fibrillation. Could be related to syncope event with Afib with RVR with hypotension  - Echo 11/2022: EF 60-65%, mild aortic sclerosis, mild MR, mild TR. Mildly dilated left atrium. - 30 day monitor December 2022 showed 10 episodes of AF, highest  BPM.  - Sleep study to assess for sleep apnea. Her  notes that she snores. -  Literature from the EAST-AFNET 4 Trial demonstrated that early rhythm control management in patients with early diagnosis of atrial fibrillation (median time of diagnosis, 36 days) resulted in reduction in cardiovascular mortality, stroke, or hospitalization with worsening heart failure or ACS. - I have discussed options including continued medical therapy and ablation in detail. I have discussed the risks of left atrial ablation including vascular complications, infection, MI, death, stroke, cardiac tamponade, esophageal fistula, phrenic nerve paralysis, PV stenosis and need for a 2nd procedure with the pt. Patient reports complete understanding of discussions and recommendations and wishes to proceed with the procedure. Anticoagulation.   - Continue Xarelto for CVA prophylaxis. - Denies of any bleeding issues. Know to contact clinic with any bleeding side effects (BRBPR, melena, hemotysis, hematuria). SVT. Short episode of SVT on monitor December 2022. Syncope, November 2022. BP low in the ED.  Episode not likely conversion pause given duration of LOC. Possibly hypotension due to RVR and baseline low BP. Hypertension.   - Stop losartan given hypotension today. - Continue to monitor BP at home. - Discussed permission hypertension. Subjective:       Remy Adams is a 74 y.o. patient who is seen for evaluation of  PAF. She is generally in good health and takes losartan for HTN. In November 2022 her  noted that she fell asleep while a passenger in the car several minutes before getting home to their house. She was difficult to arouse and her  started shacking her. Her head dropped backwards and she remained unable to be aroused. Finally she woke up. She was oriented but confused as to what had happened. Total time of loss of consciousness approximately 5 minutes per her . Her drove her to [facility] where her SBP was in the 70s. She received IVF and was discharged. She established care with Dr. Adarsh Thornton where she had a reassuring echo. She wore a 30 day monitor which showed Avg HR 70, min 50, and max 187. She had ten episodes of AF with an overall burden of 2%. She also had one episode of SVT. She was asymptomatic with these episodes. She monitors her BP occassionally at home and it ranges SBP 86-150s. Her BP was low initially in the office today and improved somewhat with recheck. She was not symptomatic with this. She is active and tries to walk at least a mile each day. She denies chest pain, dyspnea or palpitations. No other episodes of dizziness or syncope.          Patient Active Problem List   Diagnosis Code    PAF (paroxysmal atrial fibrillation) ([facility]) I48.0    Anticoagulation adequate Z79.01    Controlled type 2 diabetes mellitus with circulatory disorder, without long-term current use of insulin ([facility]) E11.59    Primary hypertension I10     Current Outpatient Medications   Medication Sig Dispense Refill    rivaroxaban (Xarelto) 20 mg tab tablet Take 1 Tablet by mouth daily (with dinner). 90 Tablet 3    metoprolol succinate (TOPROL-XL) 25 mg XL tablet Take 1 Tablet by mouth daily. 90 Tablet 3    traZODone (DESYREL) 100 mg tablet TAKE 1 TABLET BY MOUTH EVERY NIGHT 90 Tablet 1    levothyroxine (SYNTHROID) 100 mcg tablet Take 100 mcg by mouth daily. omega-3 acid ethyl esters (LOVAZA) 1 gram capsule Take 1 g by mouth daily. atorvastatin (LIPITOR) 20 mg tablet Take 20 mg by mouth daily. oxyCODONE IR (ROXICODONE) 10 mg tab immediate release tablet Take 10 mg by mouth four (4) times daily. morphine IR (MS IR) 15 mg tablet Take 15 mg by mouth two (2) times a day. ergocalciferol (ERGOCALCIFEROL) 1,250 mcg (50,000 unit) capsule Take 50,000 Units by mouth every seven (7) days. cyanocobalamin 1,000 mcg tablet Take 1,000 mcg by mouth daily. timoloL maleate 0.5 % drpd ophthalmic solution Administer 1 Drop to both eyes daily. brimonidine (ALPHAGAN P) 0.1 % ophthalmic solution Apply 1 Drop to eye two (2) times a day. Allergies   Allergen Reactions    Metronidazole Nausea and Vomiting    Netarsudil-Latanoprost Other (comments)    Sulfa (Sulfonamide Antibiotics) Hives     Past Medical History:   Diagnosis Date    Anxiety     Post-menopausal osteoporosis     Sjogren's syndrome ([facility] [place] [address]      History reviewed. No pertinent surgical history. History reviewed. No pertinent family history. Social History     Tobacco Use    Smoking status: Former     Types: Cigarettes     Quit date: 1985     Years since quittin.1    Smokeless tobacco: Never   Substance Use Topics    Alcohol use: Yes        Review of Systems:   12 point review of systems was performed.  All negative except for HPI     Objective:   /70 (BP 1 Location: Left upper arm, BP Patient Position: Sitting)   Pulse 64   Resp 16   Ht 5' 5\" (1.651 m)   Wt 55.7 kg (122 lb 14.4 oz)   SpO2 94%   BMI 20.45 kg/m²     Physical Exam:   General:  Alert and oriented, in no acute distress; thin  Head:  Atraumatic, normocephalic  Eyes:  extraocular muscles intact  Neck:  Supple, normal range of motion  Lungs:  Clear to auscultation bilaterally, no wheezes/rales/rhonchi   Cardiovascular:  Regular rate and rhythm, normal S1-S2, no murmurs/rubs/gallops  Abdomen:  Soft, nontender, nondistended, normoactive bowel sounds  Skin:  Intact, no rash  Extremities:, no clubbing, cyanosis, or edema  Musculoskeletal: normal range of motion  Neurological:  Alert and oriented, no focal neurologic deficits  Psychiatric:  Normal mood and affect    No results found for: HBA1C, DEF6JUNM, DZW0XODN, ZHK2BCLZ  EKG: sinus bradycardia, HR 58 BPM    11/18/22    ECHO ADULT COMPLETE 11/18/2022 11/18/2022    Interpretation Summary    Left Ventricle: Normal left ventricular systolic function with an estimated EF of 60 - 65%. Left ventricle size is normal. Normal wall thickness. Normal wall motion. Diastolic dysfunction present with normal LV EF. Aortic Valve: Tricuspid valve. Mild sclerosis of the aortic valve cusp. Mitral Valve: Mild regurgitation. Tricuspid Valve: Mild regurgitation. The estimated RVSP is 32 mmHg. Left Atrium: Left atrium is mildly dilated. LA Vol Index A/L is 35 mL/m2.     Signed by: Jose Pierre MD on 11/18/2022  4:05 PM        Results for orders placed or performed during the hospital encounter of 11/11/22   EKG, 12 LEAD, INITIAL   Result Value Ref Range    Ventricular Rate 49 BPM    Atrial Rate 49 BPM    P-R Interval 152 ms    QRS Duration 74 ms    Q-T Interval 464 ms    QTC Calculation (Bezet) 419 ms    Calculated P Axis 58 degrees    Calculated R Axis 7 degrees    Calculated T Axis 39 degrees    Diagnosis       Sinus bradycardia  Low voltage QRS  Borderline ECG  No previous ECGs available  Confirmed by Rafal Alford ([id number]) on 11/11/2022 4:03:37 PM               Thank you for involving me in this patient's care and please call with further concerns or questions. ________________________________________  An Yeny Bustamante MD, [address]  Cardiac Electrophysiology  [facility]  [address] [place] [address], [address] [place] [address] Avenue                             [phone]     [address] [place] [address].  [address] [place] [address], [address] [facility]  [phone]

## 2023-02-16 NOTE — PATIENT INSTRUCTIONS
- Stop losartan  - Sleep study. The order is in our system and they will reach out to you to set up.   - Continue to monitor BP at home and keep log    Your EP Study and Atrial Fibrillation Ablation procedure has been scheduled for 4/12/23 at 12:00 pm, at [facility].    Please report to Admitting Department by 10:00 am, or 2 hours prior to your scheduled procedure. Please bring a list of your current medications and medication bottles, if able, to the hospital on this day. You will be unable to drive after your procedure so please make sure to bring someone with you to your procedure. You will need to have nothing to eat or drink after midnight, the night prior to your procedure. You may have small sips of water, if needed, to take with your medication. You will also need to see Dr. Pal Led Nurse Practitioner, Fito Aldana, in office prior to your procedure. An appointment has been scheduled for 3/30/23 at 9:20 am.    You will need labs drawn prior to your procedure. Please plan to have labs drawn at your appointment with Ilsa Baltazar NP. You should stop your medication, Eliquis, the morning of your procedure. After your procedure, you will need to follow up with Dr. Chas Watkins.  Your follow-up appointment has been scheduled for 5/11/23 at 2:20 pm.

## 2023-02-20 ENCOUNTER — PATIENT MESSAGE (OUTPATIENT)
Dept: CARDIOLOGY CLINIC | Age: 73
End: 2023-02-20

## 2023-02-20 NOTE — TELEPHONE ENCOUNTER
Per PT:    Please send any test results and/or visit notes to Dr. Korina Pineda in [place], [place]. His contact information is as follows:  Phone: [phone]  Fax: [phone]  Email: [email or web address]    Office visits X3, Echo results sent to the above as requested

## 2023-03-31 ENCOUNTER — OFFICE VISIT (OUTPATIENT)
Dept: CARDIOLOGY CLINIC | Age: 73
End: 2023-03-31

## 2023-03-31 VITALS
RESPIRATION RATE: 14 BRPM | BODY MASS INDEX: 20.26 KG/M2 | HEART RATE: 64 BPM | DIASTOLIC BLOOD PRESSURE: 60 MMHG | HEIGHT: 65 IN | SYSTOLIC BLOOD PRESSURE: 94 MMHG | OXYGEN SATURATION: 97 % | WEIGHT: 121.6 LBS

## 2023-03-31 DIAGNOSIS — I48.0 PAF (PAROXYSMAL ATRIAL FIBRILLATION) (HCC): Primary | ICD-10-CM

## 2023-03-31 NOTE — PATIENT INSTRUCTIONS
Your EP Study and Atrial Fibrillation Ablation procedure has been scheduled for 4/12/23 at 12:00 pm, at [facility].     Please report to Admitting Department by 10:00 am, or 2 hours prior to your scheduled procedure. Please bring a list of your current medications and medication bottles, if able, to the hospital on this day. You will be unable to drive after your procedure so please make sure to bring someone with you to your procedure. You will need to have nothing to eat or drink after midnight, the night prior to your procedure. You may have small sips of water, if needed, to take with your medication. You will also need to see Dr. Lisa Sin Nurse Practitioner, [facility], in office prior to your procedure. An appointment has been scheduled for 3/30/23 at 9:20 am.     You will need labs drawn prior to your procedure. Please plan to have labs drawn at your appointment with Deirdre Stahl NP. You should stop your medication, Eliquis, the morning of your procedure. After your procedure, you will need to follow up with Dr. Erwin Blackwell. Your follow-up appointment has been scheduled for 5/11/23 at 2:20 pm.         Atrial Fibrillation Ablation   Discharge Instructions      You have just had an Atrial Fibrillation Ablation. There were catheters temporarily placed in your heart through a puncture in the veins and/or arteries in your groin. WHAT TO EXPECT     If you have had an Atrial Fibrillation Ablation please be aware that you may experience mild chest pain that will resolve within 24-48 hours. If the chest pain persists or becomes severe, please call the office. Mild to moderate, non-painful, bruising or mild swelling at the puncture site is not un-common, and will resolve in 7 - 14 days, and may extend down your thigh as it heals. Application of Ice to the site may help with any tenderness. You have a small gauze dressing applied to the puncture site in your groin.   You may remove this the following morning. It is not uncommon to feel palpitations during the healing phase after your ablation. If you feel as though you are having recurrence of atrial fibrillation lasting longer than 30 minutes, please contact the office. Palpitations/AFIB can occur during the healing phase (1-2 months) post ablation. MEDICATIONS     Take only the medications prescribed to you at discharge. ACTIVITY     A responsible adult must take you home. Do not drive a car for 24 hours. Rest quietly for the remainder of the day. Do not lift anything greater than 10 pounds for 7 days. Limit bending at the puncture site and use of stairs for at least 2 days. You may remove the bandage and shower the morning after the procedure. Do not take a bath for 3 days. SYMPTOMS THAT NEED TO BE REPORTED IMMEDIATELY     Bleeding at the puncture site. If there is bleeding, lie down and hold firm direct pressure for at least 5 minutes. If the bleeding does not stop, go to the closest emergency room, or call 911. Temperature more than 100.5 F. Redness or warmth at the puncture site. Increasing pain, numbness, coolness or blue discoloration of the extremity where the puncture is located. Pulsating mass at the puncture site. A new lump at the puncture site, or increasing swelling at the site. Please be aware that a pea or marble sized lump is normal, anything larger or increasing in size should be reported. Bruising at the puncture site that enlarges or becomes painful (some bruising at the site is common and will go away in 7-14 days). Dizziness, lightheadedness, fainting. REMEMBER: If you feel something is an emergency or cannot be handled over the phone, call 911 or go to the closest emergency room.       Kevin Solitario in 1 month  Yuridia Arthur NP in 3 months        Pam Aguilar MD  Cardiac Electrophysiology / Cardiology    [address] [place] [address], [address]  [phone] / [phone] Fax  [phone] / [phone] Fax
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
## 2023-04-05 RX ORDER — SODIUM CHLORIDE 0.9 % (FLUSH) 0.9 %
5-40 SYRINGE (ML) INJECTION EVERY 8 HOURS
Start: 2023-04-05

## 2023-04-05 RX ORDER — SODIUM CHLORIDE 0.9 % (FLUSH) 0.9 %
5-40 SYRINGE (ML) INJECTION AS NEEDED
Start: 2023-04-05

## 2023-04-27 ENCOUNTER — PATIENT MESSAGE (OUTPATIENT)
Dept: CARDIOLOGY CLINIC | Age: 73
End: 2023-04-27

## 2023-05-09 ENCOUNTER — TELEPHONE (OUTPATIENT)
Age: 73
End: 2023-05-09

## 2023-05-26 RX ORDER — LEVOTHYROXINE SODIUM 0.1 MG/1
100 TABLET ORAL
COMMUNITY

## 2023-05-26 RX ORDER — METOPROLOL SUCCINATE 25 MG/1
12.5 TABLET, EXTENDED RELEASE ORAL DAILY
COMMUNITY
Start: 2023-04-12

## 2023-05-26 RX ORDER — MORPHINE SULFATE 15 MG/1
15 TABLET ORAL 2 TIMES DAILY
COMMUNITY

## 2023-05-26 RX ORDER — ERGOCALCIFEROL 1.25 MG/1
50000 CAPSULE ORAL
COMMUNITY

## 2023-05-26 RX ORDER — FLECAINIDE ACETATE 50 MG/1
50 TABLET ORAL 2 TIMES DAILY
COMMUNITY
Start: 2023-04-12 | End: 2023-06-05 | Stop reason: ALTCHOICE

## 2023-05-26 RX ORDER — ATORVASTATIN CALCIUM 20 MG/1
20 TABLET, FILM COATED ORAL DAILY
COMMUNITY

## 2023-05-26 RX ORDER — TIMOLOL MALEATE 6.8 MG/ML
1 SOLUTION/ DROPS OPHTHALMIC DAILY
COMMUNITY

## 2023-05-26 RX ORDER — OXYCODONE HYDROCHLORIDE 10 MG/1
10 TABLET ORAL 4 TIMES DAILY
COMMUNITY

## 2023-05-26 RX ORDER — TRAZODONE HYDROCHLORIDE 100 MG/1
1 TABLET ORAL NIGHTLY
COMMUNITY
Start: 2023-03-28

## 2023-05-26 RX ORDER — CHLORAL HYDRATE 500 MG
1000 CAPSULE ORAL DAILY
COMMUNITY

## 2023-06-05 ENCOUNTER — OFFICE VISIT (OUTPATIENT)
Age: 73
End: 2023-06-05
Payer: MEDICARE

## 2023-06-05 VITALS
HEIGHT: 65 IN | WEIGHT: 119 LBS | OXYGEN SATURATION: 100 % | DIASTOLIC BLOOD PRESSURE: 80 MMHG | HEART RATE: 56 BPM | BODY MASS INDEX: 19.83 KG/M2 | SYSTOLIC BLOOD PRESSURE: 134 MMHG

## 2023-06-05 DIAGNOSIS — R00.1 BRADYCARDIA, UNSPECIFIED: ICD-10-CM

## 2023-06-05 DIAGNOSIS — I10 ESSENTIAL (PRIMARY) HYPERTENSION: ICD-10-CM

## 2023-06-05 DIAGNOSIS — I48.0 PAROXYSMAL ATRIAL FIBRILLATION (HCC): Primary | ICD-10-CM

## 2023-06-05 PROBLEM — Z79.899 HIGH RISK MEDICATION USE: Status: ACTIVE | Noted: 2023-06-05

## 2023-06-05 PROCEDURE — 99214 OFFICE O/P EST MOD 30 MIN: CPT | Performed by: INTERNAL MEDICINE

## 2023-06-05 PROCEDURE — G8400 PT W/DXA NO RESULTS DOC: HCPCS | Performed by: INTERNAL MEDICINE

## 2023-06-05 PROCEDURE — G8420 CALC BMI NORM PARAMETERS: HCPCS | Performed by: INTERNAL MEDICINE

## 2023-06-05 PROCEDURE — 1123F ACP DISCUSS/DSCN MKR DOCD: CPT | Performed by: INTERNAL MEDICINE

## 2023-06-05 PROCEDURE — G8428 CUR MEDS NOT DOCUMENT: HCPCS | Performed by: INTERNAL MEDICINE

## 2023-06-05 PROCEDURE — 1090F PRES/ABSN URINE INCON ASSESS: CPT | Performed by: INTERNAL MEDICINE

## 2023-06-05 PROCEDURE — 3079F DIAST BP 80-89 MM HG: CPT | Performed by: INTERNAL MEDICINE

## 2023-06-05 PROCEDURE — 1036F TOBACCO NON-USER: CPT | Performed by: INTERNAL MEDICINE

## 2023-06-05 PROCEDURE — 3075F SYST BP GE 130 - 139MM HG: CPT | Performed by: INTERNAL MEDICINE

## 2023-06-05 PROCEDURE — 3017F COLORECTAL CA SCREEN DOC REV: CPT | Performed by: INTERNAL MEDICINE

## 2023-06-05 NOTE — PROGRESS NOTES
No chief complaint on file.     Vitals:    06/05/23 1406   BP: 134/80   Site: Left Upper Arm   Position: Sitting   Pulse: 56   SpO2: 100%   Weight: 119 lb (54 kg)   Height: 5' 5\" (1.651 m)           Chest pain denied     SOB denied     Palpitations denied     Swelling in hands/feet denied     Dizziness denied     Recent hospital stays denied     Refills denied
for: HBA1C, VLB5GMBV  Lab Results   Component Value Date/Time    HGB 11.6 11/11/2022 01:55 PM     Lab Results   Component Value Date/Time    PLATELET 884 74/27/6498 01:55 PM     No results for input(s): CPK, CKMB, TROIQ in the last 72 hours. No lab exists for component: CKQMB, CPKMB             ___________________________________________________    Santana Whitaker.  Augie Quintero MD, [facility]

## 2023-06-06 ENCOUNTER — OFFICE VISIT (OUTPATIENT)
Age: 73
End: 2023-06-06
Payer: MEDICARE

## 2023-06-06 VITALS
HEIGHT: 65 IN | SYSTOLIC BLOOD PRESSURE: 108 MMHG | HEART RATE: 54 BPM | BODY MASS INDEX: 19.83 KG/M2 | DIASTOLIC BLOOD PRESSURE: 62 MMHG | WEIGHT: 119 LBS

## 2023-06-06 DIAGNOSIS — I48.0 PAF (PAROXYSMAL ATRIAL FIBRILLATION) (HCC): ICD-10-CM

## 2023-06-06 DIAGNOSIS — Z79.01 ANTICOAGULATION ADEQUATE: ICD-10-CM

## 2023-06-06 DIAGNOSIS — I10 PRIMARY HYPERTENSION: ICD-10-CM

## 2023-06-06 DIAGNOSIS — I48.0 PAROXYSMAL ATRIAL FIBRILLATION (HCC): Primary | ICD-10-CM

## 2023-06-06 DIAGNOSIS — Z79.899 HIGH RISK MEDICATION USE: ICD-10-CM

## 2023-06-06 PROCEDURE — 3017F COLORECTAL CA SCREEN DOC REV: CPT | Performed by: INTERNAL MEDICINE

## 2023-06-06 PROCEDURE — 93005 ELECTROCARDIOGRAM TRACING: CPT | Performed by: INTERNAL MEDICINE

## 2023-06-06 PROCEDURE — 1036F TOBACCO NON-USER: CPT | Performed by: INTERNAL MEDICINE

## 2023-06-06 PROCEDURE — 3074F SYST BP LT 130 MM HG: CPT | Performed by: INTERNAL MEDICINE

## 2023-06-06 PROCEDURE — 3078F DIAST BP <80 MM HG: CPT | Performed by: INTERNAL MEDICINE

## 2023-06-06 PROCEDURE — G8427 DOCREV CUR MEDS BY ELIG CLIN: HCPCS | Performed by: INTERNAL MEDICINE

## 2023-06-06 PROCEDURE — 1090F PRES/ABSN URINE INCON ASSESS: CPT | Performed by: INTERNAL MEDICINE

## 2023-06-06 PROCEDURE — G8420 CALC BMI NORM PARAMETERS: HCPCS | Performed by: INTERNAL MEDICINE

## 2023-06-06 PROCEDURE — 93010 ELECTROCARDIOGRAM REPORT: CPT | Performed by: INTERNAL MEDICINE

## 2023-06-06 PROCEDURE — 99215 OFFICE O/P EST HI 40 MIN: CPT | Performed by: INTERNAL MEDICINE

## 2023-06-06 PROCEDURE — 1123F ACP DISCUSS/DSCN MKR DOCD: CPT | Performed by: INTERNAL MEDICINE

## 2023-06-06 PROCEDURE — G8400 PT W/DXA NO RESULTS DOC: HCPCS | Performed by: INTERNAL MEDICINE

## 2023-06-06 RX ORDER — FLECAINIDE ACETATE 50 MG/1
50 TABLET ORAL 2 TIMES DAILY
COMMUNITY
End: 2023-06-06 | Stop reason: ALTCHOICE

## 2023-06-06 NOTE — PATIENT INSTRUCTIONS
Stop Flecainide today  In 1 week, stop Metoprolol  Referral to sleep center for management BHARTI  FU with NP in 3 months  FU with Dr. Arvind Kang in 9 months

## 2023-06-06 NOTE — PROGRESS NOTES
Cardiac Electrophysiology Office Follow-up    NAME: Hernandez Jamison   :  1950  MRM:  [id number]    Date:  2023       Assessment and Plan:     1. Paroxysmal atrial fibrillation (HCC)  -     EKG 12 Lead  2. PAF (paroxysmal atrial fibrillation) (HCC)  -     EKG 12 Lead  3. Primary hypertension  -     EKG 12 Lead  4. Anticoagulation adequate  -     EKG 12 Lead  5. High risk medication use  -     EKG 12 Lead     Paroxysmal atrial fibrillation. Could be related to syncope event with Afib with RVR with hypotension   - Echo 2022: EF 60-65%, mild aortic sclerosis, mild MR, mild TR. Mildly dilated left atrium. - 30 day monitor 2022 showed 10 episodes of AF, highest  BPM.   - Sleep study demonstrated severe BHARTI   - S/p PVI, GP ablation, CTI ablation ([facility])  -  I'm pleased to hear how well the patient has done post ablation. We spoke in great detail regarding the importance of multidisciplinary management of AFib and the role of risk factors modification in preventing recurrent AF including focusing on diet, weight loss, control of blood pressure, glycemic control, BHARTI diagnosis and treatment, and medication compliance. -Doing well since ablation without any recurrence. - Stop flecainide today  - 1 week stop metoprolol  - Continue anticoagulation for CVA prevention  - Referral to sleep center for treatment of severe BHARTI which is newly diagnosed  - Follow-up with NP in 3 months  - Follow-up with me in 9 months      High Risk Medication Management  We discussed in great detail regarding high risk medication management and the associated risks of antiarrhythmic therapy. Patient reports full understanding of recommendations.  -Risks associated with IC and Flecainide  - being monitor for drug side effects and toxicity.   - EKG demonstrated  ms, Qrs 93 ms.  -In the setting of bradycardia and fatigue we will stop flecainide and metoprolol      Anticoagulation.    - Continue Xarelto

## 2023-06-08 ENCOUNTER — TELEPHONE (OUTPATIENT)
Age: 73
End: 2023-06-08

## 2023-06-08 NOTE — TELEPHONE ENCOUNTER
Patient phoned the office requesting a return call to discuss the next steps following Watchpat HST.

## 2023-06-29 ENCOUNTER — HOSPITAL ENCOUNTER (OUTPATIENT)
Facility: HOSPITAL | Age: 73
End: 2023-06-29
Payer: MEDICARE

## 2023-06-29 PROCEDURE — 95811 POLYSOM 6/>YRS CPAP 4/> PARM: CPT | Performed by: INTERNAL MEDICINE

## 2023-06-30 VITALS
HEART RATE: 81 BPM | SYSTOLIC BLOOD PRESSURE: 141 MMHG | DIASTOLIC BLOOD PRESSURE: 87 MMHG | HEIGHT: 65 IN | WEIGHT: 119 LBS | TEMPERATURE: 97.9 F | OXYGEN SATURATION: 97 % | BODY MASS INDEX: 19.83 KG/M2

## 2023-07-05 ENCOUNTER — TELEPHONE (OUTPATIENT)
Facility: HOSPITAL | Age: 73
End: 2023-07-05

## 2023-07-05 DIAGNOSIS — G47.33 OBSTRUCTIVE SLEEP APNEA (ADULT) (PEDIATRIC): Primary | ICD-10-CM

## 2023-07-07 NOTE — TELEPHONE ENCOUNTER
Results of sleep study in [facility]  Lead tech to convey results to patient  She had a watchpat home sleep test ordered by cardiology-results AHI 39/h with associated oxygen desaturations. Lowest oxygen saturation noted at 69%      PAP titration was performed to optimize airflow settings to control her apnea/respiratory events. It was found that a setting of 5 cmH20 adequately controlled her respiratory events. Oxygen saturation was maintained at or above 91% at this setting. She appeared to tolerate well. All stages of sleep seen. As discussed, I will order a PAP device for her home use. . she should be seen in the sleep center after she has been on PAP for 4-6 weeks. We will assess how she is doing on PAP therapy and make any further adjustments (if needed). Patient should call the office the day she gets set up with new PAP device so we can schedule her for an adherence/compliance visit within 31-90 days of obtaining a new device. PAP order attached.   Staff to kindly order PAP and call patient and let them know which [employer] company they should be hearing from.    (patient will need a first adherence visit after 4-6 weeks on therapy)

## 2023-07-10 ENCOUNTER — CLINICAL DOCUMENTATION (OUTPATIENT)
Age: 73
End: 2023-07-10

## 2023-07-10 NOTE — PROGRESS NOTES
Faxed pap order to [facility]   Called patient LVM to inform and call us back to schedule a 60 to 90 day 1st Adherence follow up visit once patient gets pap device.

## 2023-07-21 ENCOUNTER — TELEPHONE (OUTPATIENT)
Age: 73
End: 2023-07-21

## 2023-07-21 NOTE — TELEPHONE ENCOUNTER
7/21/23 CHANDLER for pt of appt time and day change from 9/14/23 [facility] [phone]

## 2023-10-20 PROBLEM — M35.00 SJOGREN'S SYNDROME (HCC): Status: ACTIVE | Noted: 2023-10-20

## 2023-10-20 PROBLEM — H35.379 EPIRETINAL MEMBRANE: Status: ACTIVE | Noted: 2019-06-03

## 2023-10-20 PROBLEM — H40.1130 PRIMARY OPEN ANGLE GLAUCOMA (POAG) OF BOTH EYES: Status: ACTIVE | Noted: 2018-11-15

## 2023-10-20 PROBLEM — F51.01 PRIMARY INSOMNIA: Status: ACTIVE | Noted: 2017-09-15

## 2023-10-20 PROBLEM — E11.39 DIABETIC OCULOPATHY ASSOCIATED WITH TYPE 2 DIABETES MELLITUS (HCC): Status: ACTIVE | Noted: 2019-06-03

## 2023-10-20 PROBLEM — F34.1 DYSTHYMIA: Status: ACTIVE | Noted: 2017-09-15

## 2023-10-20 PROBLEM — H25.13 AGE-RELATED NUCLEAR CATARACT OF BOTH EYES: Status: ACTIVE | Noted: 2019-06-25

## 2023-10-20 PROBLEM — E55.9 VITAMIN D DEFICIENCY: Status: ACTIVE | Noted: 2023-10-20

## 2023-10-20 PROBLEM — D35.2 PITUITARY MICROADENOMA (HCC): Status: ACTIVE | Noted: 2023-10-20

## 2023-10-20 PROBLEM — H04.123 DRY EYES: Status: ACTIVE | Noted: 2019-06-03

## 2023-10-20 PROBLEM — R73.03 PRE-DIABETES: Status: ACTIVE | Noted: 2023-10-20

## 2023-12-06 ENCOUNTER — OFFICE VISIT (OUTPATIENT)
Age: 73
End: 2023-12-06
Payer: MEDICARE

## 2023-12-06 VITALS
WEIGHT: 123 LBS | HEART RATE: 71 BPM | OXYGEN SATURATION: 98 % | BODY MASS INDEX: 20.49 KG/M2 | DIASTOLIC BLOOD PRESSURE: 70 MMHG | HEIGHT: 65 IN | SYSTOLIC BLOOD PRESSURE: 120 MMHG

## 2023-12-06 DIAGNOSIS — I48.0 PAF (PAROXYSMAL ATRIAL FIBRILLATION) (HCC): Primary | ICD-10-CM

## 2023-12-06 DIAGNOSIS — R55 SYNCOPE AND COLLAPSE: ICD-10-CM

## 2023-12-06 DIAGNOSIS — I10 PRIMARY HYPERTENSION: ICD-10-CM

## 2023-12-06 PROCEDURE — 93005 ELECTROCARDIOGRAM TRACING: CPT | Performed by: INTERNAL MEDICINE

## 2023-12-06 PROCEDURE — G8428 CUR MEDS NOT DOCUMENT: HCPCS | Performed by: INTERNAL MEDICINE

## 2023-12-06 PROCEDURE — G8420 CALC BMI NORM PARAMETERS: HCPCS | Performed by: INTERNAL MEDICINE

## 2023-12-06 PROCEDURE — 1123F ACP DISCUSS/DSCN MKR DOCD: CPT | Performed by: INTERNAL MEDICINE

## 2023-12-06 PROCEDURE — 93010 ELECTROCARDIOGRAM REPORT: CPT | Performed by: INTERNAL MEDICINE

## 2023-12-06 PROCEDURE — 1090F PRES/ABSN URINE INCON ASSESS: CPT | Performed by: INTERNAL MEDICINE

## 2023-12-06 PROCEDURE — 3078F DIAST BP <80 MM HG: CPT | Performed by: INTERNAL MEDICINE

## 2023-12-06 PROCEDURE — 3074F SYST BP LT 130 MM HG: CPT | Performed by: INTERNAL MEDICINE

## 2023-12-06 PROCEDURE — 99214 OFFICE O/P EST MOD 30 MIN: CPT | Performed by: INTERNAL MEDICINE

## 2023-12-06 PROCEDURE — G8400 PT W/DXA NO RESULTS DOC: HCPCS | Performed by: INTERNAL MEDICINE

## 2023-12-06 PROCEDURE — 1036F TOBACCO NON-USER: CPT | Performed by: INTERNAL MEDICINE

## 2023-12-06 PROCEDURE — 3017F COLORECTAL CA SCREEN DOC REV: CPT | Performed by: INTERNAL MEDICINE

## 2023-12-06 PROCEDURE — G8484 FLU IMMUNIZE NO ADMIN: HCPCS | Performed by: INTERNAL MEDICINE

## 2023-12-06 RX ORDER — LEVOTHYROXINE SODIUM 0.1 MG/1
100 TABLET ORAL DAILY
COMMUNITY

## 2023-12-06 NOTE — PROGRESS NOTES
Office Follow-up    NAME: Anshul Cardozo   :  1950  MRM:  [id number]    Date:  1/3/2023         Assessment and Plan:     1. Syncope: Unclear cause of syncope in 2022. She associates having COVID vaccination and possible dehydration and low blood pressure. We evaluated a 30 Day Loop recorder which demonstrated 10 episodes of atrial fibrillation with the highest heart rate of 187 bpm as well as 1 episode of short SVT. No prior history of atrial fibrillation. This is a new diagnosis. Syncope could be related to SVT/A. fib which can be treatable therefore she can resume driving. 2.  Paroxysmal atrial fibrillation (s/p ablation 2023): She remains in sinus rhythm. Continue metoprolol. Continue Xeralto. 3.  Bradycardia: Lowest heart rate recorded on Holter monitor was 50 bpm.  Now off Metoprolol. We will continue to monitor. 4.  Hypertension: Blood pressure is normal. Stopped Losartan and Lopressor. Remain normal.       5.  History of diabetes/metabolic syndrome with prediabetes:      6. Dyslipidemia: Continue Lipitor. 7. See Dr. Micki Medina in 6 months. ATTENTION:   This medical record was transcribed using an electronic medical records/speech recognition system. Although proofread, it may and can contain electronic, spelling and other errors. Corrections may be executed at a later time. Please feel free to contact us for any clarifications as needed. Subjective:     Anshul Cardozo, a 74y.o. year-old who presents for followup. She underwent Holter monitor as well as echocardiogram.  Echo demonstrated normal EF. Holter demonstrated episodes of A. fib and SVT.      ----------------    22:  HPI: Anshul Cardozo is a 74 y.o. female with past medical history significant for hypertension, dyslipidemia, hypothyroidism is here for evaluation of symptoms of syncope.   On 2022 she was with her  going for shopping and while

## 2024-01-17 PROBLEM — D68.69 SECONDARY HYPERCOAGULABLE STATE (HCC): Status: ACTIVE | Noted: 2024-01-17

## 2024-01-26 ENCOUNTER — TELEPHONE (OUTPATIENT)
Age: 74
End: 2024-01-26

## 2024-02-29 RX ORDER — RIVAROXABAN 20 MG/1
20 TABLET, FILM COATED ORAL
Qty: 90 TABLET | Refills: 3 | Status: SHIPPED | OUTPATIENT
Start: 2024-02-29

## 2024-02-29 NOTE — TELEPHONE ENCOUNTER
Refill per VO of Dr. Anthony Braxton:    12/6/2023    Future Appointments   Date Time Provider Department Center   6/3/2024  2:40 PM Pallavi Chase APRN - NP [facility]   6/6/2024 11:40 AM Anthony Braxton MD [facility]   1/17/2025  9:30 AM Pablo Holt MD [facility] Kar[facility] Sched       Requested Prescriptions     Pending Prescriptions Disp Refills    XARELTO 20 MG TABS tablet [Pharmacy Med Name: XARELTO 20MG TABLETS] 90 tablet      Sig: TAKE 1 TABLET BY MOUTH DAILY WITH DINNER

## 2024-06-03 ENCOUNTER — OFFICE VISIT (OUTPATIENT)
Age: 74
End: 2024-06-03
Payer: MEDICARE

## 2024-06-03 VITALS
RESPIRATION RATE: 14 BRPM | DIASTOLIC BLOOD PRESSURE: 80 MMHG | BODY MASS INDEX: 21.63 KG/M2 | WEIGHT: 129.8 LBS | HEART RATE: 70 BPM | HEIGHT: 65 IN | OXYGEN SATURATION: 97 % | SYSTOLIC BLOOD PRESSURE: 134 MMHG

## 2024-06-03 DIAGNOSIS — Z79.01 ANTICOAGULATED: ICD-10-CM

## 2024-06-03 DIAGNOSIS — I48.0 PAF (PAROXYSMAL ATRIAL FIBRILLATION) (HCC): Primary | ICD-10-CM

## 2024-06-03 PROCEDURE — 99214 OFFICE O/P EST MOD 30 MIN: CPT | Performed by: NURSE PRACTITIONER

## 2024-06-03 PROCEDURE — 3079F DIAST BP 80-89 MM HG: CPT | Performed by: NURSE PRACTITIONER

## 2024-06-03 PROCEDURE — 1036F TOBACCO NON-USER: CPT | Performed by: NURSE PRACTITIONER

## 2024-06-03 PROCEDURE — 3075F SYST BP GE 130 - 139MM HG: CPT | Performed by: NURSE PRACTITIONER

## 2024-06-03 PROCEDURE — 1123F ACP DISCUSS/DSCN MKR DOCD: CPT | Performed by: NURSE PRACTITIONER

## 2024-06-03 PROCEDURE — G8427 DOCREV CUR MEDS BY ELIG CLIN: HCPCS | Performed by: NURSE PRACTITIONER

## 2024-06-03 PROCEDURE — G8400 PT W/DXA NO RESULTS DOC: HCPCS | Performed by: NURSE PRACTITIONER

## 2024-06-03 PROCEDURE — 1090F PRES/ABSN URINE INCON ASSESS: CPT | Performed by: NURSE PRACTITIONER

## 2024-06-03 PROCEDURE — 93005 ELECTROCARDIOGRAM TRACING: CPT | Performed by: NURSE PRACTITIONER

## 2024-06-03 PROCEDURE — G8420 CALC BMI NORM PARAMETERS: HCPCS | Performed by: NURSE PRACTITIONER

## 2024-06-03 PROCEDURE — 93010 ELECTROCARDIOGRAM REPORT: CPT | Performed by: NURSE PRACTITIONER

## 2024-06-03 RX ORDER — OXYCODONE HYDROCHLORIDE 10 MG/1
10 TABLET ORAL 4 TIMES DAILY
COMMUNITY
Start: 2024-05-31
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
## 2024-06-04 ENCOUNTER — TELEPHONE (OUTPATIENT)
Age: 74
End: 2024-06-04

## 2024-06-04 NOTE — TELEPHONE ENCOUNTER
----- Message from NIGEL Reynolds NP sent at 6/3/2024  3:56 PM EDT -----  Please mail one week monitor dx: AF    Thanks!

## 2024-07-19 ENCOUNTER — TELEPHONE (OUTPATIENT)
Age: 74
End: 2024-07-19

## 2024-07-19 NOTE — TELEPHONE ENCOUNTER
----- Message from Elis Ha MD sent at 7/19/2024  2:27 PM EDT -----  Please bring the patient in for FU next avialable for FU of AF management from event monitor.       Called patient, ID verified using two patient identifiers.  Notified of results below and Dr. Ha's recommendations.    Interpretation Summary     One week extended Holter monitor  Predominant sinus rhythm with heart rate ranging between  bpm, average heart rate of 73 bpm.   atrial fibrillation detected 3.9%, longest lasting 5 hours and 46 mins.  Rare PACs/PVCs with a burden of <1%    Scheduled for next available office visit.  Patient verbalized understanding and will call back with any other questions or concerns.    Future Appointments   Date Time Provider Department Center   7/26/2024 11:40 AM Elis Ha MD [facility]   1/17/2025  9:30 AM Pablo Holt MD [facility]   6/13/2025 11:20 AM Elis Ha MD [facility]

## 2024-07-26 ENCOUNTER — OFFICE VISIT (OUTPATIENT)
Age: 74
End: 2024-07-26
Payer: MEDICARE

## 2024-07-26 VITALS
HEART RATE: 65 BPM | BODY MASS INDEX: 21.52 KG/M2 | WEIGHT: 129.2 LBS | SYSTOLIC BLOOD PRESSURE: 122 MMHG | DIASTOLIC BLOOD PRESSURE: 70 MMHG | OXYGEN SATURATION: 98 % | HEIGHT: 65 IN

## 2024-07-26 DIAGNOSIS — E11.59 CONTROLLED TYPE 2 DIABETES MELLITUS WITH OTHER CIRCULATORY COMPLICATION, WITHOUT LONG-TERM CURRENT USE OF INSULIN (HCC): ICD-10-CM

## 2024-07-26 DIAGNOSIS — E78.5 HYPERLIPIDEMIA, UNSPECIFIED HYPERLIPIDEMIA TYPE: ICD-10-CM

## 2024-07-26 DIAGNOSIS — I10 PRIMARY HYPERTENSION: ICD-10-CM

## 2024-07-26 DIAGNOSIS — I48.0 PAF (PAROXYSMAL ATRIAL FIBRILLATION) (HCC): Primary | ICD-10-CM

## 2024-07-26 DIAGNOSIS — Z79.01 ANTICOAGULATION ADEQUATE: ICD-10-CM

## 2024-07-26 PROCEDURE — 99214 OFFICE O/P EST MOD 30 MIN: CPT | Performed by: INTERNAL MEDICINE

## 2024-07-26 PROCEDURE — 1036F TOBACCO NON-USER: CPT | Performed by: INTERNAL MEDICINE

## 2024-07-26 PROCEDURE — 3074F SYST BP LT 130 MM HG: CPT | Performed by: INTERNAL MEDICINE

## 2024-07-26 PROCEDURE — G8427 DOCREV CUR MEDS BY ELIG CLIN: HCPCS | Performed by: INTERNAL MEDICINE

## 2024-07-26 PROCEDURE — G8399 PT W/DXA RESULTS DOCUMENT: HCPCS | Performed by: INTERNAL MEDICINE

## 2024-07-26 PROCEDURE — 1090F PRES/ABSN URINE INCON ASSESS: CPT | Performed by: INTERNAL MEDICINE

## 2024-07-26 PROCEDURE — 1123F ACP DISCUSS/DSCN MKR DOCD: CPT | Performed by: INTERNAL MEDICINE

## 2024-07-26 PROCEDURE — G8420 CALC BMI NORM PARAMETERS: HCPCS | Performed by: INTERNAL MEDICINE

## 2024-07-26 PROCEDURE — 3078F DIAST BP <80 MM HG: CPT | Performed by: INTERNAL MEDICINE

## 2024-07-26 RX ORDER — CALCITRIOL 0.25 UG/1
0.25 CAPSULE, LIQUID FILLED ORAL DAILY
COMMUNITY
Start: 2024-07-09

## 2024-07-26 NOTE — PROGRESS NOTES
Chief Complaint   Patient presents with    Atrial Fibrillation     Vitals:    07/26/24 1143   BP: 122/70   Site: Left Upper Arm   Position: Sitting   Pulse: 65   SpO2: 98%   Weight: 58.6 kg (129 lb 3.2 oz)   Height: 1.651 m (5' 5\")     Chest pain: DENIED     Recent hospital stays: DENIED     Refills: DENIED

## 2024-07-26 NOTE — PROGRESS NOTES
Cardiac Electrophysiology Office Follow-up    NAME: Ariadna Xiong   :  1950  MRM:  [id number]    Date:  2024       Assessment and Plan:     1. PAF (paroxysmal atrial fibrillation) (HCC)  2. Primary hypertension  3. Controlled type 2 diabetes mellitus with other circulatory complication, without long-term current use of insulin (HCC)  4. Anticoagulation adequate  5. Hyperlipidemia, unspecified hyperlipidemia type       Paroxysmal atrial fibrillation   Could be related to syncope event with Afib with RVR with hypotension   - Echo 2022: EF 60-65%, mild aortic sclerosis, mild MR, mild TR. Mildly dilated left atrium.   - Sleep study demonstrated severe BHARTI   - S/p PVI, GP ablation, CTI ablation ([facility])  -   We spoke in great detail regarding the importance of multidisciplinary management of AFib and the role of risk factors modification in preventing recurrent AF including focusing on diet, weight loss, control of blood pressure, glycemic control, BHARTI diagnosis and treatment, and medication compliance.  -Doing well since ablation without any recurrence.  - Continue anticoagulation for CVA prevention  - Continue CPAP for sleep apnea  - 24  1 week monitor which showed AF burden of 3.9%, longest lasting 5 hours and 46 mins.  - She dealing with significant stressor with her  s/p kidney transplant  - will trial AAD, given history of thyroid disease, Amiodarone is less ideal. Will trial Dronedarone 400 mg twice a day  - FU with me in 3 months    Anticoagulation   - Continue Xarelto for CVA prophylaxis.    - Denies of any bleeding issues. Know to contact clinic with any bleeding side effects (BRBPR, melena, hemotysis, hematuria).      SVT   Short episode of SVT on monitor 2022.       Syncope, 2022.    BP low in the ED. Episode not likely conversion pause given duration of LOC. Possibly hypotension due to RVR and baseline low BP.  - Limiting medication as noted above

## 2024-11-07 RX ORDER — DRONEDARONE 400 MG/1
400 TABLET, FILM COATED ORAL 2 TIMES DAILY WITH MEALS
Qty: 60 TABLET | Refills: 0 | Status: SHIPPED | OUTPATIENT
Start: 2024-11-07

## 2024-11-20 ENCOUNTER — TELEPHONE (OUTPATIENT)
Age: 74
End: 2024-11-20

## 2024-11-20 NOTE — TELEPHONE ENCOUNTER
Patient states that she would like to speak with nurse about her experiencing swelling in her ankles and legs and if she should be concerned.      Phone [phone]

## 2024-11-21 NOTE — TELEPHONE ENCOUNTER
Received call from patient, ID verified using two patient identifiers.  Ms. Xiong states that she noticed some swelling on her ankles yesterday and doesn't know if it needs to be addressed prior to her appointment with Dr. Ha next week.    She says when she takes her ankle socks off in the evening she notices a little indentation.  The swelling is gone by the morning.  She denies any weight gain or shortness of breath.    Advised Ms. Xiong I don't think there is any reason to be concerned.  She should keep an eye on the swelling which could be related to diet.    Dr. Ha can address the slight swelling next week if he is concerned.    If she develops any other symptoms like abdominal fullness, shortness of breath or pitting edema she should certainly call back.    Patient verbalized understanding and will call back with any other questions or concerns.    Future Appointments   Date Time Provider Department Center   11/27/2024  8:20 AM Elis Ha MD [facility]   1/17/2025  9:30 AM [facility]

## 2024-11-27 ENCOUNTER — OFFICE VISIT (OUTPATIENT)
Age: 74
End: 2024-11-27
Payer: MEDICARE

## 2024-11-27 VITALS
WEIGHT: 136 LBS | OXYGEN SATURATION: 99 % | BODY MASS INDEX: 22.66 KG/M2 | DIASTOLIC BLOOD PRESSURE: 80 MMHG | HEART RATE: 62 BPM | HEIGHT: 65 IN | SYSTOLIC BLOOD PRESSURE: 118 MMHG

## 2024-11-27 DIAGNOSIS — E11.59 CONTROLLED TYPE 2 DIABETES MELLITUS WITH OTHER CIRCULATORY COMPLICATION, WITHOUT LONG-TERM CURRENT USE OF INSULIN (HCC): ICD-10-CM

## 2024-11-27 DIAGNOSIS — E78.5 HYPERLIPIDEMIA, UNSPECIFIED HYPERLIPIDEMIA TYPE: ICD-10-CM

## 2024-11-27 DIAGNOSIS — E11.39 DIABETIC OCULOPATHY ASSOCIATED WITH TYPE 2 DIABETES MELLITUS (HCC): ICD-10-CM

## 2024-11-27 DIAGNOSIS — I48.0 PAF (PAROXYSMAL ATRIAL FIBRILLATION) (HCC): Primary | ICD-10-CM

## 2024-11-27 DIAGNOSIS — I10 PRIMARY HYPERTENSION: ICD-10-CM

## 2024-11-27 DIAGNOSIS — Z79.01 ANTICOAGULATION ADEQUATE: ICD-10-CM

## 2024-11-27 DIAGNOSIS — Z79.899 HIGH RISK MEDICATION USE: ICD-10-CM

## 2024-11-27 PROCEDURE — 99214 OFFICE O/P EST MOD 30 MIN: CPT | Performed by: INTERNAL MEDICINE

## 2024-11-27 PROCEDURE — 93005 ELECTROCARDIOGRAM TRACING: CPT | Performed by: INTERNAL MEDICINE

## 2024-11-27 RX ORDER — FUROSEMIDE 20 MG/1
20 TABLET ORAL DAILY PRN
Qty: 60 TABLET | Refills: 3 | Status: SHIPPED | OUTPATIENT
Start: 2024-11-27

## 2024-11-27 NOTE — PROGRESS NOTES
Chief Complaint   Patient presents with    Other     PAF     Vitals:    11/27/24 0824   Height: 1.651 m (5' 5\")     Chest pain: DENIED     Recent hospital stays: DENIED     Refills: DENIED

## 2024-11-27 NOTE — PROGRESS NOTES
Chief Complaint   Patient presents with    Other     PAF     Vitals:    11/27/24 0824   BP: 118/80   Site: Left Upper Arm   Position: Sitting   Pulse: 62   SpO2: 99%   Weight: 61.7 kg (136 lb)   Height: 1.651 m (5' 5\")       Chest pain NO     ER, urgent care, or hospitalized outside of [facility] since your last visit?  NO     Refills NO

## 2024-11-27 NOTE — PROGRESS NOTES
Cardiac Electrophysiology Office Follow-up    NAME: Ariadna Xiong   :  1950  MRM:  [id number]    Date:  2024       Assessment and Plan:     1. PAF (paroxysmal atrial fibrillation) ([facility])  -     EKG 12 Lead  2. Primary hypertension  3. Controlled type 2 diabetes mellitus with other circulatory complication, without long-term current use of insulin ([facility])  4. 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 Anticoagulation adequate  6. High risk medication use  7. Hyperlipidemia, unspecified hyperlipidemia type         Paroxysmal atrial fibrillation   Could be related to syncope event with Afib with RVR with hypotension   - Echo 2022: EF 60-65%, mild aortic sclerosis, mild MR, mild TR. Mildly dilated left atrium.   - Sleep study demonstrated severe BHARTI   - S/p PVI, GP ablation, CTI ablation (23-Ha)  -   We spoke in great detail regarding the importance of multidisciplinary management of AFib and the role of risk factors modification in preventing recurrent AF including focusing on diet, weight loss, control of blood pressure, glycemic control, BHARTI diagnosis and treatment, and medication compliance.  -Doing well since ablation without any recurrence.  - Continue anticoagulation for CVA prevention  - Continue CPAP for sleep apnea  - 24  1 week monitor which showed AF burden of 3.9%, longest lasting 5 hours and 46 mins.  - Now having severe fatigue on Dronedarone, would like to come off of it  - stop Dronedarone today, check 2 week event monitor in 1-2 months  - I have discussed options including continued medical therapy and ablation in detail. I have discussed the risks of left atrial ablation including vascular complications, infection, MI, death, stroke, cardiac tamponade, esophageal fistula, phrenic nerve paralysis, PV stenosis and need for a 2nd procedure with the pt. Patient reports complete understanding of discussions and recommendations and wishes to proceed

## 2024-11-27 NOTE — PATIENT INSTRUCTIONS
Start Lasix 20 mg as needed for severe swelling  Check echocardiogram next available  Stop Dronedarone  Check 2 week event monitor in 1-2 months - this will be mailed to you  Schedule for Afib ablation in March of 2025    Schedule for atrial fibrillation ablation next available  Please hold anticoagulation on the AM of the procedure  Obtain blood work prior to the procedure    For more information regarding atrial fibrillation management, please visit:  [email or web address]

## 2024-12-02 ENCOUNTER — ANCILLARY PROCEDURE (OUTPATIENT)
Age: 74
End: 2024-12-02
Payer: MEDICARE

## 2024-12-02 VITALS
BODY MASS INDEX: 22.66 KG/M2 | WEIGHT: 136 LBS | HEART RATE: 62 BPM | SYSTOLIC BLOOD PRESSURE: 126 MMHG | HEIGHT: 65 IN | DIASTOLIC BLOOD PRESSURE: 78 MMHG

## 2024-12-02 DIAGNOSIS — E11.59 CONTROLLED TYPE 2 DIABETES MELLITUS WITH OTHER CIRCULATORY COMPLICATION, WITHOUT LONG-TERM CURRENT USE OF INSULIN (HCC): ICD-10-CM

## 2024-12-02 DIAGNOSIS — Z79.01 ANTICOAGULATION ADEQUATE: ICD-10-CM

## 2024-12-02 DIAGNOSIS — Z79.899 HIGH RISK MEDICATION USE: ICD-10-CM

## 2024-12-02 DIAGNOSIS — I10 PRIMARY HYPERTENSION: ICD-10-CM

## 2024-12-02 DIAGNOSIS — E11.39 DIABETIC OCULOPATHY ASSOCIATED WITH TYPE 2 DIABETES MELLITUS (HCC): ICD-10-CM

## 2024-12-02 DIAGNOSIS — I48.0 PAF (PAROXYSMAL ATRIAL FIBRILLATION) (HCC): ICD-10-CM

## 2024-12-02 DIAGNOSIS — E78.5 HYPERLIPIDEMIA, UNSPECIFIED HYPERLIPIDEMIA TYPE: ICD-10-CM

## 2024-12-02 PROCEDURE — 93306 TTE W/DOPPLER COMPLETE: CPT | Performed by: INTERNAL MEDICINE

## 2024-12-04 LAB
ECHO AO ASC DIAM: 3.2 CM
ECHO AO ASCENDING AORTA INDEX: 1.9 CM/M2
ECHO AO ROOT DIAM: 3.5 CM
ECHO AO ROOT INDEX: 2.08 CM/M2
ECHO AV AREA PEAK VELOCITY: 1.8 CM2
ECHO AV AREA VTI: 2.2 CM2
ECHO AV AREA/BSA PEAK VELOCITY: 1.1 CM2/M2
ECHO AV AREA/BSA VTI: 1.3 CM2/M2
ECHO AV MEAN GRADIENT: 5 MMHG
ECHO AV MEAN VELOCITY: 1.1 M/S
ECHO AV PEAK GRADIENT: 10 MMHG
ECHO AV PEAK VELOCITY: 1.6 M/S
ECHO AV VELOCITY RATIO: 0.56
ECHO AV VTI: 31 CM
ECHO BSA: 1.68 M2
ECHO EST RA PRESSURE: 3 MMHG
ECHO LA DIAMETER INDEX: 2.26 CM/M2
ECHO LA DIAMETER: 3.8 CM
ECHO LA TO AORTIC ROOT RATIO: 1.09
ECHO LA VOL A-L A2C: 73 ML (ref 22–52)
ECHO LA VOL A-L A4C: 88 ML (ref 22–52)
ECHO LA VOL BP: 77 ML (ref 22–52)
ECHO LA VOL MOD A2C: 69 ML (ref 22–52)
ECHO LA VOL MOD A4C: 84 ML (ref 22–52)
ECHO LA VOL/BSA BIPLANE: 46 ML/M2 (ref 16–34)
ECHO LA VOLUME AREA LENGTH: 81 ML
ECHO LA VOLUME INDEX A-L A2C: 43 ML/M2 (ref 16–34)
ECHO LA VOLUME INDEX A-L A4C: 52 ML/M2 (ref 16–34)
ECHO LA VOLUME INDEX AREA LENGTH: 48 ML/M2 (ref 16–34)
ECHO LA VOLUME INDEX MOD A2C: 41 ML/M2 (ref 16–34)
ECHO LA VOLUME INDEX MOD A4C: 50 ML/M2 (ref 16–34)
ECHO LV E' LATERAL VELOCITY: 9.14 CM/S
ECHO LV E' SEPTAL VELOCITY: 5.78 CM/S
ECHO LV EDV A2C: 40 ML
ECHO LV EDV A4C: 53 ML
ECHO LV EDV BP: 46 ML (ref 56–104)
ECHO LV EDV INDEX A4C: 32 ML/M2
ECHO LV EDV INDEX BP: 27 ML/M2
ECHO LV EDV NDEX A2C: 24 ML/M2
ECHO LV EJECTION FRACTION A2C: 60 %
ECHO LV EJECTION FRACTION A4C: 61 %
ECHO LV EJECTION FRACTION BIPLANE: 59 % (ref 55–100)
ECHO LV ESV A2C: 16 ML
ECHO LV ESV A4C: 20 ML
ECHO LV ESV BP: 19 ML (ref 19–49)
ECHO LV ESV INDEX A2C: 10 ML/M2
ECHO LV ESV INDEX A4C: 12 ML/M2
ECHO LV ESV INDEX BP: 11 ML/M2
ECHO LV FRACTIONAL SHORTENING: 41 % (ref 28–44)
ECHO LV INTERNAL DIMENSION DIASTOLE INDEX: 2.74 CM/M2
ECHO LV INTERNAL DIMENSION DIASTOLIC: 4.6 CM (ref 3.9–5.3)
ECHO LV INTERNAL DIMENSION SYSTOLIC INDEX: 1.61 CM/M2
ECHO LV INTERNAL DIMENSION SYSTOLIC: 2.7 CM
ECHO LV IVSD: 0.9 CM (ref 0.6–0.9)
ECHO LV MASS 2D: 137.7 G (ref 67–162)
ECHO LV MASS INDEX 2D: 82 G/M2 (ref 43–95)
ECHO LV POSTERIOR WALL DIASTOLIC: 0.9 CM (ref 0.6–0.9)
ECHO LV RELATIVE WALL THICKNESS RATIO: 0.39
ECHO LVOT AREA: 3.1 CM2
ECHO LVOT AV VTI INDEX: 0.69
ECHO LVOT DIAM: 2 CM
ECHO LVOT MEAN GRADIENT: 2 MMHG
ECHO LVOT PEAK GRADIENT: 3 MMHG
ECHO LVOT PEAK VELOCITY: 0.9 M/S
ECHO LVOT STROKE VOLUME INDEX: 39.8 ML/M2
ECHO LVOT SV: 66.9 ML
ECHO LVOT VTI: 21.3 CM
ECHO MV A VELOCITY: 0.72 M/S
ECHO MV AREA PHT: 3.3 CM2
ECHO MV E DECELERATION TIME (DT): 228.9 MS
ECHO MV E VELOCITY: 0.97 M/S
ECHO MV E/A RATIO: 1.35
ECHO MV E/E' LATERAL: 10.61
ECHO MV E/E' RATIO (AVERAGED): 13.7
ECHO MV E/E' SEPTAL: 16.78
ECHO MV PRESSURE HALF TIME (PHT): 66.4 MS
ECHO MV REGURGITANT PEAK GRADIENT: 77 MMHG
ECHO MV REGURGITANT PEAK VELOCITY: 4.4 M/S
ECHO PULMONARY ARTERY END DIASTOLIC PRESSURE: 2 MMHG
ECHO PV MAX VELOCITY: 0.8 M/S
ECHO PV PEAK GRADIENT: 3 MMHG
ECHO PV REGURGITANT MAX VELOCITY: 0.8 M/S
ECHO RIGHT VENTRICULAR SYSTOLIC PRESSURE (RVSP): 50 MMHG
ECHO RV INTERNAL DIMENSION: 3.6 CM
ECHO RV TAPSE: 2.1 CM (ref 1.7–?)
ECHO RVOT PEAK GRADIENT: 1 MMHG
ECHO RVOT PEAK VELOCITY: 0.5 M/S
ECHO TV REGURGITANT MAX VELOCITY: 3.42 M/S
ECHO TV REGURGITANT PEAK GRADIENT: 47 MMHG

## 2024-12-04 PROCEDURE — 93306 TTE W/DOPPLER COMPLETE: CPT | Performed by: INTERNAL MEDICINE

## 2024-12-10 ENCOUNTER — TELEPHONE (OUTPATIENT)
Age: 74
End: 2024-12-10

## 2024-12-11 ENCOUNTER — TELEPHONE (OUTPATIENT)
Age: 74
End: 2024-12-11

## 2024-12-11 DIAGNOSIS — I48.0 PAROXYSMAL ATRIAL FIBRILLATION (HCC): ICD-10-CM

## 2024-12-11 DIAGNOSIS — I48.0 PAF (PAROXYSMAL ATRIAL FIBRILLATION) (HCC): Primary | ICD-10-CM

## 2024-12-11 NOTE — TELEPHONE ENCOUNTER
Spoke to Pt of Procedure with Dr. Ha on 4/23/25  at 11:30am arriving at 9:30am Please NPO from Midnight the night prior to the procedure. Please have lab work done Between 3/23/25-4/10/25. Check in at the 1st floor [facility]Pt [facility] desk at [facility]  Medications:  Hold Xarelto day of procedure     VO BY /Nurse Corin JOHNSON

## 2025-01-27 ENCOUNTER — TELEPHONE (OUTPATIENT)
Age: 75
End: 2025-01-27

## 2025-01-27 NOTE — TELEPHONE ENCOUNTER
Enrolled with [employer] - Ordered and being shipped to patient's home address on file.  ETA within 5-7 Business Days.     Blessing Langston Carrie; Lilo Perry         Previous Messages       ----- Message -----  From: Corin Garcia RN  Sent: 11/27/2024   9:27 AM EST  To: Lilo Langston     check 2 week event monitor in 2 months - AFIB    Future Appointments  12/2/2024  1:30 PM    [facility] ECHO 3            [facility]               [facility]  1/17/2025  9:30 AM    Pablo Holt MD        [facility]               Karli Sched

## 2025-02-25 ENCOUNTER — TELEPHONE (OUTPATIENT)
Age: 75
End: 2025-02-25

## 2025-02-25 NOTE — TELEPHONE ENCOUNTER
Sent Holter results for Dr. Ornelas review. Results are attached to the monitor order under cardiology tab.    Preliminary findings   Patient had a min HR of 48 bpm, max HR of 195 bpm, and avg HR of 76 bpm. Predominant underlying rhythm was Sinus Rhythm.   52 Supraventricular Tachycardia runs occurred, the run with the fastest interval lasting 8 beats with a max rate of 184 bpm, the longest lasting 17 beats with an avg rate of 129 bpm.   Some episodes of Supraventricular Tachycardia may be possible Atrial Tachycardia with variable block. Atrial Fibrillation occurred (5% burden), ranging from  bpm (avg of 132 bpm), the longest lasting 11 hours 50 mins with an avg rate of 130 bpm.   Isolated SVEs were rare (<1.0%), SVE Couplets were rare (<1.0%), and SVE Triplets were rare (<1.0%). Isolated VEs were occasional (1.1%, 42578), VE Couplets were rare (<1.0%, 14), and no VE Triplets were present. Ventricular Bigeminy and Trigeminy were present.   MD notification criteria for Rapid Atrial Fibrillation met

## 2025-02-25 NOTE — TELEPHONE ENCOUNTER
----- Message from Dr. Elis Ha MD sent at 2/25/2025  1:08 PM EST -----  Please call to let patient know that her monitor confirms ongoing episode of Paroxysmal Atrial fibrillation detected, longest lasting 11 hours and 50 mins, ave rate in AF at 132 bpm. [place] measured at 5%. HR is fast in AF and I would recommend keeping her ablation date. Thanks.
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
## 2025-03-24 ENCOUNTER — OFFICE VISIT (OUTPATIENT)
Age: 75
End: 2025-03-24
Payer: MEDICARE

## 2025-03-24 VITALS
OXYGEN SATURATION: 97 % | SYSTOLIC BLOOD PRESSURE: 100 MMHG | BODY MASS INDEX: 22.3 KG/M2 | HEART RATE: 70 BPM | DIASTOLIC BLOOD PRESSURE: 60 MMHG | HEIGHT: 65 IN

## 2025-03-24 DIAGNOSIS — I47.10 PSVT (PAROXYSMAL SUPRAVENTRICULAR TACHYCARDIA): ICD-10-CM

## 2025-03-24 DIAGNOSIS — Z79.01 ANTICOAGULATED: ICD-10-CM

## 2025-03-24 DIAGNOSIS — G47.33 OSA (OBSTRUCTIVE SLEEP APNEA): ICD-10-CM

## 2025-03-24 DIAGNOSIS — I48.0 PAF (PAROXYSMAL ATRIAL FIBRILLATION) (HCC): Primary | ICD-10-CM

## 2025-03-24 DIAGNOSIS — I48.0 PAF (PAROXYSMAL ATRIAL FIBRILLATION) (HCC): ICD-10-CM

## 2025-03-24 LAB
ANION GAP SERPL CALC-SCNC: 2 MMOL/L (ref 2–12)
BASOPHILS # BLD: 0.03 K/UL (ref 0–0.1)
BASOPHILS NFR BLD: 0.6 % (ref 0–1)
BUN SERPL-MCNC: 9 MG/DL (ref 6–20)
BUN/CREAT SERPL: 14 (ref 12–20)
CALCIUM SERPL-MCNC: 8.9 MG/DL (ref 8.5–10.1)
CHLORIDE SERPL-SCNC: 105 MMOL/L (ref 97–108)
CO2 SERPL-SCNC: 31 MMOL/L (ref 21–32)
CREAT SERPL-MCNC: 0.64 MG/DL (ref 0.55–1.02)
DIFFERENTIAL METHOD BLD: ABNORMAL
EOSINOPHIL # BLD: 0.17 K/UL (ref 0–0.4)
EOSINOPHIL NFR BLD: 3.3 % (ref 0–7)
ERYTHROCYTE [DISTWIDTH] IN BLOOD BY AUTOMATED COUNT: 15.7 % (ref 11.5–14.5)
GLUCOSE SERPL-MCNC: 85 MG/DL (ref 65–100)
HCT VFR BLD AUTO: 35.7 % (ref 35–47)
HGB BLD-MCNC: 11.3 G/DL (ref 11.5–16)
IMM GRANULOCYTES # BLD AUTO: 0.01 K/UL (ref 0–0.04)
IMM GRANULOCYTES NFR BLD AUTO: 0.2 % (ref 0–0.5)
LYMPHOCYTES # BLD: 1.23 K/UL (ref 0.8–3.5)
LYMPHOCYTES NFR BLD: 24 % (ref 12–49)
MCH RBC QN AUTO: 28 PG (ref 26–34)
MCHC RBC AUTO-ENTMCNC: 31.7 G/DL (ref 30–36.5)
MCV RBC AUTO: 88.4 FL (ref 80–99)
MONOCYTES # BLD: 0.49 K/UL (ref 0–1)
MONOCYTES NFR BLD: 9.6 % (ref 5–13)
NEUTS SEG # BLD: 3.19 K/UL (ref 1.8–8)
NEUTS SEG NFR BLD: 62.3 % (ref 32–75)
NRBC # BLD: 0 K/UL (ref 0–0.01)
NRBC BLD-RTO: 0 PER 100 WBC
PLATELET # BLD AUTO: 248 K/UL (ref 150–400)
PMV BLD AUTO: 9.6 FL (ref 8.9–12.9)
POTASSIUM SERPL-SCNC: 4.3 MMOL/L (ref 3.5–5.1)
RBC # BLD AUTO: 4.04 M/UL (ref 3.8–5.2)
SODIUM SERPL-SCNC: 138 MMOL/L (ref 136–145)
WBC # BLD AUTO: 5.1 K/UL (ref 3.6–11)

## 2025-03-24 PROCEDURE — G8420 CALC BMI NORM PARAMETERS: HCPCS | Performed by: NURSE PRACTITIONER

## 2025-03-24 PROCEDURE — G8427 DOCREV CUR MEDS BY ELIG CLIN: HCPCS | Performed by: NURSE PRACTITIONER

## 2025-03-24 PROCEDURE — 3074F SYST BP LT 130 MM HG: CPT | Performed by: NURSE PRACTITIONER

## 2025-03-24 PROCEDURE — 99214 OFFICE O/P EST MOD 30 MIN: CPT | Performed by: NURSE PRACTITIONER

## 2025-03-24 PROCEDURE — 1123F ACP DISCUSS/DSCN MKR DOCD: CPT | Performed by: NURSE PRACTITIONER

## 2025-03-24 PROCEDURE — 1090F PRES/ABSN URINE INCON ASSESS: CPT | Performed by: NURSE PRACTITIONER

## 2025-03-24 PROCEDURE — 1036F TOBACCO NON-USER: CPT | Performed by: NURSE PRACTITIONER

## 2025-03-24 PROCEDURE — G8399 PT W/DXA RESULTS DOCUMENT: HCPCS | Performed by: NURSE PRACTITIONER

## 2025-03-24 PROCEDURE — 1159F MED LIST DOCD IN RCRD: CPT | Performed by: NURSE PRACTITIONER

## 2025-03-24 PROCEDURE — 1126F AMNT PAIN NOTED NONE PRSNT: CPT | Performed by: NURSE PRACTITIONER

## 2025-03-24 PROCEDURE — 3078F DIAST BP <80 MM HG: CPT | Performed by: NURSE PRACTITIONER

## 2025-03-24 NOTE — PROGRESS NOTES
Chief Complaint   Patient presents with    Atrial Fibrillation     PAF     Vitals:    03/24/25 1101   BP: 100/60   BP Site: Left Upper Arm   Patient Position: Sitting   Pulse: 70   SpO2: 97%   Height: 1.651 m (5' 5\")         Chest pain: DENIED     Recent hospital stays: DENIED     Refills: DENIED

## 2025-03-24 NOTE — PATIENT INSTRUCTIONS
Medication Instructions:     You do not need to hold any medications prior to your procedure.                 Nothing to eat or drink after midnight before your procedure.      You may take all other medications as directed the morning of your procedure with a sip of water unless otherwise indicated.           Please contact the office [phone] and ask for a member of Dr. Ha's team for procedure questions. There is a physician on call for the office after hours for immediate needs.      FOLLOW UP:              Your appointments will be made for you post procedure based off of discharge instructions. You may have driving restrictions for a short time after your procedure (usually 2-3 days). Pacemaker/ICD patients will be unable to have an MRI until 6 weeks after implant, NO dental work for 8 weeks after your implant.      Atrial Fibrillation Ablation   Discharge Instructions        You have just had an Atrial Fibrillation Ablation. There were catheters temporarily placed in your heart through a puncture in the veins and/or arteries in your groin.          WHAT TO EXPECT      If you have had an Atrial Fibrillation Ablation please be aware that you may experience mild chest pain that will resolve within 24-48 hours.  If the chest pain persists or becomes severe, please call the office.     Mild to moderate, non-painful, bruising or mild swelling at the puncture site is not un-common, and will resolve in 7 - 14 days, and may extend down your thigh as it heals. Application of Ice to the site may help with any tenderness.     You have a small gauze dressing applied to the puncture site in your groin.  You may remove this the following morning.      It is not uncommon to feel palpitations during the healing phase after your ablation. If you feel as though you are having recurrence of atrial fibrillation lasting longer than 30 minutes, please contact the office.     Palpitations/AFIB can occur during the healing

## 2025-03-25 ENCOUNTER — RESULTS FOLLOW-UP (OUTPATIENT)
Age: 75
End: 2025-03-25

## 2025-03-26 ENCOUNTER — PREP FOR PROCEDURE (OUTPATIENT)
Age: 75
End: 2025-03-26

## 2025-03-27 RX ORDER — SODIUM CHLORIDE 0.9 % (FLUSH) 0.9 %
5-40 SYRINGE (ML) INJECTION EVERY 12 HOURS SCHEDULED
Status: CANCELLED | OUTPATIENT
Start: 2025-03-27

## 2025-03-27 RX ORDER — SODIUM CHLORIDE 9 MG/ML
INJECTION, SOLUTION INTRAVENOUS PRN
Status: CANCELLED | OUTPATIENT
Start: 2025-03-27

## 2025-03-27 RX ORDER — SODIUM CHLORIDE 0.9 % (FLUSH) 0.9 %
5-40 SYRINGE (ML) INJECTION PRN
Status: CANCELLED | OUTPATIENT
Start: 2025-03-27

## 2025-05-05 ENCOUNTER — PATIENT MESSAGE (OUTPATIENT)
Age: 75
End: 2025-05-05

## 2025-05-05 RX ORDER — RIVAROXABAN 20 MG/1
20 TABLET, FILM COATED ORAL
Qty: 90 TABLET | Refills: 3 | OUTPATIENT
Start: 2025-05-05

## 2025-05-05 NOTE — TELEPHONE ENCOUNTER
Refill per VO of Dr. Anthony Braxton:    3/24/2025    Future Appointments   Date Time Provider Department Center   7/17/2025  9:10 AM Ariadna Gold, APRN - NP [facility]       Requested Prescriptions     Pending Prescriptions Disp Refills    XARELTO 20 MG TABS tablet [Pharmacy Med Name: XARELTO 20MG TABLETS] 90 tablet 3     Sig: TAKE 1 TABLET BY MOUTH DAILY WITH DINNER

## 2025-05-06 NOTE — TELEPHONE ENCOUNTER
Refill per VO of Dr. Ha  Last appt: 6/3/2024    Requested Prescriptions     Signed Prescriptions Disp Refills    rivaroxaban (XARELTO) 20 MG TABS tablet 90 tablet 3     Sig: Take 1 tablet by mouth Daily with supper     Authorizing Provider: ELIS HA     Ordering User: ANUP YORK     Future Appointments   Date Time Provider Department Center   7/17/2025  9:10 AM Ariadna Gold APRN - NP [facility]   3/18/2026  1:00 PM Elis Ha MD [facility]

## 2025-06-11 ENCOUNTER — TELEPHONE (OUTPATIENT)
Age: 75
End: 2025-06-11

## 2025-06-11 DIAGNOSIS — I48.0 PAROXYSMAL ATRIAL FIBRILLATION (HCC): Primary | ICD-10-CM

## 2025-07-16 NOTE — PROGRESS NOTES
[address]   [place] [address]   Tel.  [phone]   Fax. [phone]  [address]   [place] [address]   Tel.  [phone]   Fax. [phone] [address] [place] [address]   Tel.  [phone]   Fax. [phone]     Ariadna Xiong (: 1950) is a 75 y.o. female, established patient, seen for positive airway pressure follow-up, she was last seen by Dr. Smith on 2023, prior notes reviewed in detail.  Home sleep test 2023 showed AHI of 39/hr with a lowest SpO2 of 69%. A subsequent titration study showed apnea responding to CPAP therapy.     ASSESSMENT/PLAN:   Diagnosis Orders   1. BHARTI (obstructive sleep apnea)  DME Order for (Specify) as OP      2. Chronic atrial fibrillation (HCC)        3. BMI 22.0-22.9, adult          AHI = 39 ().  On CPAP, Resmed :  5-6 cmH2O. Set up 8/15/2023.    She is adherent with PAP therapy and PAP continues to benefit patient and remains necessary for control of her sleep apnea.     No follow-up provider specified.    Sleep Apnea -  Continue on current pressures.     Orders Placed This Encounter   Procedures    DME Order for (Specify) as OP     Diagnosis: (G47.33) BHARTI (obstructive sleep apnea)  (primary encounter diagnosis)     Replacement Supplies for Positive Airway Pressure Therapy Device:   Duration of need: 99 months.      Nasal Pillows Combo Mask (Replace) 2 per month.   Nasal Pillows (Replace) 2 per month.        Headgear 1 every 6 months.   Positive Airway Pressure chinstrap 1 every 6 months.     Tubing with heating element 1 every 3 months.     Filter(s) Disposable 2 per month.   Filter(s) Non-Disposable 1 every 6 months.   .    Water Chamber for Humidifier (Replace) 1 every 6 months.    ANDER Byrd-BC NPI: [id number]    Electronically signed. Date:25     *  Counseling was provided regarding the importance of regular PAP use with emphasis

## 2025-07-17 ENCOUNTER — TELEMEDICINE (OUTPATIENT)
Age: 75
End: 2025-07-17
Payer: MEDICARE

## 2025-07-17 ENCOUNTER — CLINICAL DOCUMENTATION (OUTPATIENT)
Age: 75
End: 2025-07-17

## 2025-07-17 DIAGNOSIS — G47.33 OSA (OBSTRUCTIVE SLEEP APNEA): Primary | ICD-10-CM

## 2025-07-17 DIAGNOSIS — I48.20 CHRONIC ATRIAL FIBRILLATION (HCC): ICD-10-CM

## 2025-07-17 PROCEDURE — 99214 OFFICE O/P EST MOD 30 MIN: CPT | Performed by: NURSE PRACTITIONER

## 2025-07-17 ASSESSMENT — SLEEP AND FATIGUE QUESTIONNAIRES
HOW LIKELY ARE YOU TO NOD OFF OR FALL ASLEEP WHILE WATCHING TV: SLIGHT CHANCE OF DOZING
HOW LIKELY ARE YOU TO NOD OFF OR FALL ASLEEP WHILE SITTING QUIETLY AFTER LUNCH WITHOUT ALCOHOL: WOULD NEVER DOZE
HOW LIKELY ARE YOU TO NOD OFF OR FALL ASLEEP WHILE LYING DOWN TO REST IN THE AFTERNOON WHEN CIRCUMSTANCES PERMIT: SLIGHT CHANCE OF DOZING
HOW LIKELY ARE YOU TO NOD OFF OR FALL ASLEEP WHILE SITTING AND READING: SLIGHT CHANCE OF DOZING
HOW LIKELY ARE YOU TO NOD OFF OR FALL ASLEEP WHILE SITTING AND READING: SLIGHT CHANCE OF DOZING
ESS TOTAL SCORE: 5
HOW LIKELY ARE YOU TO NOD OFF OR FALL ASLEEP WHILE SITTING AND TALKING TO SOMEONE: WOULD NEVER DOZE
HOW LIKELY ARE YOU TO NOD OFF OR FALL ASLEEP WHEN YOU ARE A PASSENGER IN A CAR FOR AN HOUR WITHOUT A BREAK: MODERATE CHANCE OF DOZING
HOW LIKELY ARE YOU TO NOD OFF OR FALL ASLEEP WHEN YOU ARE A PASSENGER IN A CAR FOR AN HOUR WITHOUT A BREAK: MODERATE CHANCE OF DOZING
HOW LIKELY ARE YOU TO NOD OFF OR FALL ASLEEP IN A CAR, WHILE STOPPED FOR A FEW MINUTES IN TRAFFIC: WOULD NEVER DOZE
HOW LIKELY ARE YOU TO NOD OFF OR FALL ASLEEP WHILE SITTING INACTIVE IN A PUBLIC PLACE: WOULD NEVER DOZE
HOW LIKELY ARE YOU TO NOD OFF OR FALL ASLEEP WHILE SITTING AND TALKING TO SOMEONE: WOULD NEVER DOZE
HOW LIKELY ARE YOU TO NOD OFF OR FALL ASLEEP WHILE WATCHING TV: SLIGHT CHANCE OF DOZING
HOW LIKELY ARE YOU TO NOD OFF OR FALL ASLEEP WHILE SITTING INACTIVE IN A PUBLIC PLACE: WOULD NEVER DOZE
HOW LIKELY ARE YOU TO NOD OFF OR FALL ASLEEP WHILE LYING DOWN TO REST IN THE AFTERNOON WHEN CIRCUMSTANCES PERMIT: SLIGHT CHANCE OF DOZING
HOW LIKELY ARE YOU TO NOD OFF OR FALL ASLEEP IN A CAR, WHILE STOPPED FOR A FEW MINUTES IN TRAFFIC: WOULD NEVER DOZE
HOW LIKELY ARE YOU TO NOD OFF OR FALL ASLEEP WHILE SITTING QUIETLY AFTER LUNCH WITHOUT ALCOHOL: WOULD NEVER DOZE

## 2025-07-17 NOTE — PATIENT INSTRUCTIONS
[address]  [place] [address]  Tel.  [phone]  Fax. [phone] [address]  [place] [address]  Tel.  [phone]  Fax. [phone] [address] [place] [address]  Tel.  [phone]  Fax. [phone]     Learning About CPAP for Sleep Apnea  What is CPAP?              CPAP is a small machine that you use at home every night while you sleep. It increases air pressure in your throat to keep your airway open. When you have sleep apnea, this can help you sleep better so you feel much better. CPAP stands for \"continuous positive airway pressure.\"  The CPAP machine will have one of the following:  A mask that covers your nose and mouth  Prongs that fit into your nose  A mask that covers your nose only, the most common type. This type is called NCPAP. The N stands for \"nasal.\"  Why is it done?  CPAP is usually the best treatment for obstructive sleep apnea. It is the first treatment choice and the most widely used. Your doctor may suggest CPAP if you have:  Moderate to severe sleep apnea.  Sleep apnea and coronary artery disease (CAD) or heart failure.  How does it help?  CPAP can help you have more normal sleep, so you feel less sleepy and more alert during the daytime.  CPAP may help keep heart failure or other heart problems from getting worse.  NCPAP may help lower your blood pressure.  If you use CPAP, your bed partner may also sleep better because you are not snoring or restless.  What are the side effects?  Some people who use CPAP have:  A dry or stuffy nose and a sore throat.  Irritated skin on the face.  Sore eyes.  Bloating.  If you have any of these problems, work with your doctor to fix them. Here are some things you can try:  Be sure the mask or nasal prongs fit well.  See if your doctor can adjust the pressure of your CPAP.  If your nose is dry, try a humidifier.  If your nose is runny or stuffy, try decongestant medicine or a steroid

## 2025-08-13 ENCOUNTER — TELEPHONE (OUTPATIENT)
Age: 75
End: 2025-08-13